# Patient Record
Sex: MALE | Race: BLACK OR AFRICAN AMERICAN | NOT HISPANIC OR LATINO | Employment: OTHER | URBAN - METROPOLITAN AREA
[De-identification: names, ages, dates, MRNs, and addresses within clinical notes are randomized per-mention and may not be internally consistent; named-entity substitution may affect disease eponyms.]

---

## 2017-04-10 ENCOUNTER — HOSPITAL ENCOUNTER (INPATIENT)
Facility: HOSPITAL | Age: 52
LOS: 2 days | Discharge: HOME HEALTH | DRG: 064 | End: 2017-04-12
Attending: PSYCHIATRY & NEUROLOGY | Admitting: PSYCHIATRY & NEUROLOGY
Payer: COMMERCIAL

## 2017-04-10 DIAGNOSIS — G93.6 CYTOTOXIC CEREBRAL EDEMA: ICD-10-CM

## 2017-04-10 DIAGNOSIS — I63.411 EMBOLIC STROKE INVOLVING RIGHT MIDDLE CEREBRAL ARTERY: ICD-10-CM

## 2017-04-10 DIAGNOSIS — E08.01 DIABETES MELLITUS DUE TO UNDERLYING CONDITION WITH HYPEROSMOLARITY AND COMA, WITH LONG-TERM CURRENT USE OF INSULIN: ICD-10-CM

## 2017-04-10 DIAGNOSIS — R00.0 TACHYCARDIA: ICD-10-CM

## 2017-04-10 DIAGNOSIS — R09.89 BRUIT: ICD-10-CM

## 2017-04-10 DIAGNOSIS — Z79.4 DIABETES MELLITUS DUE TO UNDERLYING CONDITION WITH HYPEROSMOLARITY AND COMA, WITH LONG-TERM CURRENT USE OF INSULIN: ICD-10-CM

## 2017-04-10 DIAGNOSIS — I10 HYPERTENSION: ICD-10-CM

## 2017-04-10 DIAGNOSIS — E86.1 HYPOVOLEMIA: ICD-10-CM

## 2017-04-10 DIAGNOSIS — I63.9 CVA (CEREBRAL VASCULAR ACCIDENT): ICD-10-CM

## 2017-04-10 DIAGNOSIS — E11.00 TYPE 2 DIABETES MELLITUS WITH HYPEROSMOLARITY WITHOUT COMA, WITHOUT LONG-TERM CURRENT USE OF INSULIN: Primary | ICD-10-CM

## 2017-04-10 PROBLEM — Z92.82 TISSUE PLASMINOGEN ACTIVATOR (T-PA) ADMINISTERED AT OTHER FACILITY WITHIN 24 HOURS PRIOR TO CURRENT ADMISSION: Status: ACTIVE | Noted: 2017-04-10

## 2017-04-10 PROBLEM — N18.30 CKD (CHRONIC KIDNEY DISEASE) STAGE 3, GFR 30-59 ML/MIN: Status: ACTIVE | Noted: 2017-04-10

## 2017-04-10 PROBLEM — G81.14 SPASTIC HEMIPLEGIA AFFECTING LEFT NONDOMINANT SIDE: Status: ACTIVE | Noted: 2017-04-10

## 2017-04-10 PROBLEM — R29.810 FACIAL DROOP: Status: ACTIVE | Noted: 2017-04-10

## 2017-04-10 LAB
ALBUMIN SERPL BCP-MCNC: 3.5 G/DL
ALP SERPL-CCNC: 84 U/L
ALT SERPL W/O P-5'-P-CCNC: 21 U/L
ANION GAP SERPL CALC-SCNC: 13 MMOL/L
APTT BLDCRRT: 24.2 SEC
AST SERPL-CCNC: 31 U/L
BASOPHILS # BLD AUTO: 0.01 K/UL
BASOPHILS NFR BLD: 0.1 %
BILIRUB SERPL-MCNC: 0.4 MG/DL
BUN SERPL-MCNC: 18 MG/DL
CALCIUM SERPL-MCNC: 8.4 MG/DL
CHLORIDE SERPL-SCNC: 106 MMOL/L
CHOLEST/HDLC SERPL: 4.7 {RATIO}
CHOLEST/HDLC SERPL: 5 {RATIO}
CO2 SERPL-SCNC: 20 MMOL/L
CREAT SERPL-MCNC: 1.6 MG/DL
DIFFERENTIAL METHOD: ABNORMAL
EOSINOPHIL # BLD AUTO: 0 K/UL
EOSINOPHIL NFR BLD: 0 %
ERYTHROCYTE [DISTWIDTH] IN BLOOD BY AUTOMATED COUNT: 13.7 %
EST. GFR  (AFRICAN AMERICAN): 56.8 ML/MIN/1.73 M^2
EST. GFR  (NON AFRICAN AMERICAN): 49.2 ML/MIN/1.73 M^2
GLUCOSE SERPL-MCNC: 149 MG/DL
HCT VFR BLD AUTO: 44.5 %
HDL/CHOLESTEROL RATIO: 20.2 %
HDL/CHOLESTEROL RATIO: 21.1 %
HDLC SERPL-MCNC: 180 MG/DL
HDLC SERPL-MCNC: 208 MG/DL
HDLC SERPL-MCNC: 38 MG/DL
HDLC SERPL-MCNC: 42 MG/DL
HGB BLD-MCNC: 15.6 G/DL
INR PPP: 1
INR PPP: 1.1
LDLC SERPL CALC-MCNC: 127 MG/DL
LDLC SERPL CALC-MCNC: 145.6 MG/DL
LYMPHOCYTES # BLD AUTO: 2 K/UL
LYMPHOCYTES NFR BLD: 16.5 %
MAGNESIUM SERPL-MCNC: 1.9 MG/DL
MCH RBC QN AUTO: 27.3 PG
MCHC RBC AUTO-ENTMCNC: 35.1 %
MCV RBC AUTO: 78 FL
MONOCYTES # BLD AUTO: 0.6 K/UL
MONOCYTES NFR BLD: 5.1 %
NEUTROPHILS # BLD AUTO: 9.6 K/UL
NEUTROPHILS NFR BLD: 77.9 %
NONHDLC SERPL-MCNC: 142 MG/DL
NONHDLC SERPL-MCNC: 166 MG/DL
PHOSPHATE SERPL-MCNC: 6.1 MG/DL
PLATELET # BLD AUTO: 213 K/UL
PMV BLD AUTO: 10.6 FL
POCT GLUCOSE: 158 MG/DL (ref 70–110)
POCT GLUCOSE: 29 MG/DL (ref 70–110)
POTASSIUM SERPL-SCNC: 3.4 MMOL/L
PROT SERPL-MCNC: 7 G/DL
PROTHROMBIN TIME: 10.9 SEC
PROTHROMBIN TIME: 11.2 SEC
RBC # BLD AUTO: 5.72 M/UL
SODIUM SERPL-SCNC: 139 MMOL/L
T4 FREE SERPL-MCNC: 1.18 NG/DL
TRIGL SERPL-MCNC: 102 MG/DL
TRIGL SERPL-MCNC: 75 MG/DL
TSH SERPL DL<=0.005 MIU/L-ACNC: 0.19 UIU/ML
TSH SERPL DL<=0.005 MIU/L-ACNC: 0.53 UIU/ML
WBC # BLD AUTO: 12.32 K/UL

## 2017-04-10 PROCEDURE — 93010 ELECTROCARDIOGRAM REPORT: CPT | Mod: ,,, | Performed by: INTERNAL MEDICINE

## 2017-04-10 PROCEDURE — 83735 ASSAY OF MAGNESIUM: CPT

## 2017-04-10 PROCEDURE — 83036 HEMOGLOBIN GLYCOSYLATED A1C: CPT | Mod: 91

## 2017-04-10 PROCEDURE — 99223 1ST HOSP IP/OBS HIGH 75: CPT | Mod: ,,, | Performed by: ANESTHESIOLOGY

## 2017-04-10 PROCEDURE — 25000003 PHARM REV CODE 250: Performed by: ANESTHESIOLOGY

## 2017-04-10 PROCEDURE — 84443 ASSAY THYROID STIM HORMONE: CPT | Mod: 91

## 2017-04-10 PROCEDURE — 85730 THROMBOPLASTIN TIME PARTIAL: CPT

## 2017-04-10 PROCEDURE — 84443 ASSAY THYROID STIM HORMONE: CPT

## 2017-04-10 PROCEDURE — 20000000 HC ICU ROOM

## 2017-04-10 PROCEDURE — 85610 PROTHROMBIN TIME: CPT

## 2017-04-10 PROCEDURE — 84439 ASSAY OF FREE THYROXINE: CPT

## 2017-04-10 PROCEDURE — 85025 COMPLETE CBC W/AUTO DIFF WBC: CPT

## 2017-04-10 PROCEDURE — 80061 LIPID PANEL: CPT

## 2017-04-10 PROCEDURE — 85610 PROTHROMBIN TIME: CPT | Mod: 91

## 2017-04-10 PROCEDURE — 93005 ELECTROCARDIOGRAM TRACING: CPT

## 2017-04-10 PROCEDURE — 86850 RBC ANTIBODY SCREEN: CPT

## 2017-04-10 PROCEDURE — 86900 BLOOD TYPING SEROLOGIC ABO: CPT

## 2017-04-10 PROCEDURE — 80053 COMPREHEN METABOLIC PANEL: CPT

## 2017-04-10 PROCEDURE — 84100 ASSAY OF PHOSPHORUS: CPT

## 2017-04-10 PROCEDURE — 80061 LIPID PANEL: CPT | Mod: 91

## 2017-04-10 PROCEDURE — 83036 HEMOGLOBIN GLYCOSYLATED A1C: CPT

## 2017-04-10 PROCEDURE — 25000003 PHARM REV CODE 250: Performed by: NURSE PRACTITIONER

## 2017-04-10 RX ORDER — SODIUM CHLORIDE 9 MG/ML
INJECTION, SOLUTION INTRAVENOUS CONTINUOUS
Status: DISCONTINUED | OUTPATIENT
Start: 2017-04-10 | End: 2017-04-11

## 2017-04-10 RX ORDER — SODIUM CHLORIDE 0.9 % (FLUSH) 0.9 %
3 SYRINGE (ML) INJECTION EVERY 8 HOURS
Status: DISCONTINUED | OUTPATIENT
Start: 2017-04-10 | End: 2017-04-12 | Stop reason: HOSPADM

## 2017-04-10 RX ORDER — CARVEDILOL 12.5 MG/1
12.5 TABLET ORAL 2 TIMES DAILY WITH MEALS
Status: DISCONTINUED | OUTPATIENT
Start: 2017-04-11 | End: 2017-04-12 | Stop reason: HOSPADM

## 2017-04-10 RX ORDER — CLONIDINE HYDROCHLORIDE 0.1 MG/1
0.1 TABLET ORAL 2 TIMES DAILY
Status: DISCONTINUED | OUTPATIENT
Start: 2017-04-10 | End: 2017-04-12 | Stop reason: HOSPADM

## 2017-04-10 RX ORDER — POLYETHYLENE GLYCOL 3350 17 G/17G
17 POWDER, FOR SOLUTION ORAL DAILY
Status: DISCONTINUED | OUTPATIENT
Start: 2017-04-11 | End: 2017-04-12 | Stop reason: HOSPADM

## 2017-04-10 RX ORDER — ONDANSETRON 2 MG/ML
4 INJECTION INTRAMUSCULAR; INTRAVENOUS EVERY 12 HOURS PRN
Status: DISCONTINUED | OUTPATIENT
Start: 2017-04-10 | End: 2017-04-12 | Stop reason: HOSPADM

## 2017-04-10 RX ORDER — INSULIN ASPART 100 [IU]/ML
1-10 INJECTION, SOLUTION INTRAVENOUS; SUBCUTANEOUS EVERY 6 HOURS PRN
Status: DISCONTINUED | OUTPATIENT
Start: 2017-04-10 | End: 2017-04-12 | Stop reason: HOSPADM

## 2017-04-10 RX ORDER — GLUCAGON 1 MG
1 KIT INJECTION
Status: DISCONTINUED | OUTPATIENT
Start: 2017-04-10 | End: 2017-04-12 | Stop reason: HOSPADM

## 2017-04-10 RX ORDER — AMOXICILLIN 250 MG
1 CAPSULE ORAL 2 TIMES DAILY
Status: DISCONTINUED | OUTPATIENT
Start: 2017-04-10 | End: 2017-04-12 | Stop reason: HOSPADM

## 2017-04-10 RX ORDER — ATORVASTATIN CALCIUM 10 MG/1
10 TABLET, FILM COATED ORAL DAILY
Status: DISCONTINUED | OUTPATIENT
Start: 2017-04-11 | End: 2017-04-11

## 2017-04-10 RX ADMIN — Medication 3 ML: at 10:04

## 2017-04-10 RX ADMIN — SODIUM CHLORIDE 1000 ML: 0.9 INJECTION, SOLUTION INTRAVENOUS at 09:04

## 2017-04-10 RX ADMIN — CLONIDINE HYDROCHLORIDE 0.1 MG: 0.1 TABLET ORAL at 09:04

## 2017-04-10 NOTE — IP AVS SNAPSHOT
[facility]  [address]  [facility] [address]  Phone: [phone]           Patient Discharge Instructions   Our goal is to set you up for success. This packet includes information on your condition, medications, and your home care.  It will help you care for yourself to prevent having to return to the hospital.     Please ask your nurse if you have any questions.      There are many details to remember when preparing to leave the hospital. Here is what you will need to do:    1. Take your medicine. If you are prescribed medications, review your Medication List on the following pages. You may have new medications to  at the pharmacy and others that you'll need to stop taking. Review the instructions for how and when to take your medications. Talk with your doctor or nurses if you are unsure of what to do.     2. Go to your follow-up appointments. Specific follow-up information is listed in the following pages. Your may be contacted by a nurse or clinical provider about future appointments. Be sure we have all of the phone numbers to reach you. Please contact your provider's office if you are unable to make an appointment.     3. Watch for warning signs. Your doctor or nurse will give you detailed warning signs to watch for and when to call for assistance. These instructions may also include educational information about your condition. If you experience any of warning signs to your health, call your doctor.           [facility]  Unless otherwise directed by your provider, please   contact [facility], our nurse care line   that is available for 24/7 assistance.     [phone] (toll-free)     Registered nurses in the [facility]   provide: appointment scheduling, clinical advisement, health education, and other advisory services.                  ** Verify the list of medication(s) below is accurate and up to date. Carry this with you in case of  emergency. If your medications have changed, please notify your healthcare provider.             Medication List      START taking these medications        Additional Info                      aspirin 325 MG tablet   Refills:  0   Dose:  325 mg    Last time this was given:  325 mg on 4/12/2017  8:05 AM   Instructions:  Take 1 tablet (325 mg total) by mouth once daily.     Begin Date    AM    Noon    PM    Bedtime       nicotine 21 mg/24 hr   Commonly known as:  NICODERM CQ   Refills:  0   Dose:  1 patch    Last time this was given:  1 patch on 4/12/2017  8:05 AM   Instructions:  Place 1 patch onto the skin once daily.     Begin Date    AM    Noon    PM    Bedtime         CHANGE how you take these medications        Additional Info                      atorvastatin 40 MG tablet   Commonly known as:  LIPITOR   Quantity:  30 tablet   Refills:  1   Dose:  40 mg   What changed:    - medication strength  - how much to take    Last time this was given:  40 mg on 4/12/2017  8:05 AM   Instructions:  Take 1 tablet (40 mg total) by mouth once daily.     Begin Date    AM    Noon    PM    Bedtime         CONTINUE taking these medications        Additional Info                      baclofen 10 MG tablet   Commonly known as:  LIORESAL   Refills:  0   Dose:  10 mg    Instructions:  Take 10 mg by mouth 2 (two) times daily.     Begin Date    AM    Noon    PM    Bedtime       benazepril 20 MG tablet   Commonly known as:  LOTENSIN   Refills:  0   Dose:  20 mg    Instructions:  Take 20 mg by mouth once daily.     Begin Date    AM    Noon    PM    Bedtime       carisoprodol 350 MG tablet   Commonly known as:  SOMA   Refills:  0   Dose:  350 mg    Instructions:  Take 350 mg by mouth 4 (four) times daily as needed.     Begin Date    AM    Noon    PM    Bedtime       carvedilol 12.5 MG tablet   Commonly known as:  COREG   Refills:  0   Dose:  12.5 mg    Last time this was given:  12.5 mg on 4/12/2017  5:15 PM   Instructions:  Take 12.5 mg  by mouth 2 (two) times daily with meals.     Begin Date    AM    Noon    PM    Bedtime       cloNIDine 0.2 MG tablet   Commonly known as:  CATAPRES   Refills:  0   Dose:  0.2 mg   Indications:  Hypertension    Last time this was given:  0.1 mg on 4/12/2017  8:05 AM   Instructions:  Take 0.2 mg by mouth 2 (two) times daily.     Begin Date    AM    Noon    PM    Bedtime       gabapentin 600 MG tablet   Commonly known as:  NEURONTIN   Refills:  0   Dose:  600 mg    Instructions:  Take 600 mg by mouth 3 (three) times daily.     Begin Date    AM    Noon    PM    Bedtime       GLUCOPHAGE 500 MG tablet   Refills:  0   Dose:  500 mg   Generic drug:  metformin    Instructions:  Take 500 mg by mouth 2 (two) times daily with meals.     Begin Date    AM    Noon    PM    Bedtime       nitroGLYCERIN 0.4 MG/DOSE TL SPRY 400 mcg/spray spray   Commonly known as:  NITROLINGUAL   Refills:  0   Dose:  1 spray    Instructions:  Place 1 spray under the tongue every 5 (five) minutes as needed.     Begin Date    AM    Noon    PM    Bedtime       * oxycodone 30 MG Tab   Commonly known as:  ROXICODONE   Refills:  0   Dose:  5 mg   Indications:  Pain    Last time this was given:  5 mg on 4/12/2017 11:03 AM   Instructions:  Take 5 mg by mouth every 6 (six) hours as needed.     Begin Date    AM    Noon    PM    Bedtime       * oxycodone 5 mg Cap   Commonly known as:  OXY-IR   Refills:  0   Dose:  10 mg   Indications:  Pain    Instructions:  Take 10 mg by mouth every 4 (four) hours as needed.     Begin Date    AM    Noon    PM    Bedtime       TRIBENZOR 40-10-25 mg Tab   Refills:  0   Generic drug:  olmesartan-amlodipin-hcthiazid    Instructions:  Take by mouth.     Begin Date    AM    Noon    PM    Bedtime       * Notice:  This list has 2 medication(s) that are the same as other medications prescribed for you. Read the directions carefully, and ask your doctor or other care provider to review them with you.         Where to Get Your Medications       These medications were sent to [facility] - [place], [place] - [address]  [address], [place] [address]     Phone:  [phone]     atorvastatin 40 MG tablet         You can get these medications from any pharmacy     You don't need a prescription for these medications     aspirin 325 MG tablet    nicotine 21 mg/24 hr                  Please bring to all follow up appointments:    1. A copy of your discharge instructions.  2. All medicines you are currently taking in their original bottles.  3. Identification and insurance card.    Please arrive 15 minutes ahead of scheduled appointment time.    Please call 24 hours in advance if you must reschedule your appointment and/or time.        Follow-up Information     Follow up with John Small MD In 1 week.    Specialty:  Internal Medicine    Contact information:    [facility] [facility] [address] [phone]          Follow up with [facility] In 4 weeks.    Specialty:  Vascular Neurology    Why:  stroke follow up appointment    Contact information:    [address] [place] [id number] [phone]        Discharge Instructions     Future Orders    Activity as tolerated     Diet general     Questions:    Total calories:      Fat restriction, if any:      Protein restriction, if any:      Na restriction, if any:      Fluid restriction:      Additional restrictions:  Diabetic 1800        Primary Diagnosis     Your primary diagnosis was:  Stroke      Admission Information     Date & Time Provider Department CSN    4/10/2017  7:51 PM Jose Samano MD [facility] [id number]      Care Providers     Provider Role Specialty Primary office phone    Jose Samano MD Attending Provider Neurology [phone]    Amador Huber MD Consulting Physician  Neuro Critical Care [phone]    Torres Kumar MD Consulting Physician   "Neuro Critical Care [phone]    Akira Leung MD Consulting Physician  Interventional Neurology [phone]    Angel Aviles MD Consulting Physician  Neurology [phone]      Your Vitals Were     BP Pulse Temp Resp Height Weight    178/76 (BP Location: Right arm, Patient Position: Sitting, BP Method: Automatic) 76 98.6 °F (37 °C) (Oral) 22 5' 11" (1.803 m) 69.2 kg (152 lb 8.9 oz)    SpO2 BMI             100% 21.28 kg/m2         Recent Lab Values        4/10/2017 4/10/2017                        8:57 PM  9:55 PM          A1C 6.9 (H) 6.9 (H)          Comment for A1C at  8:57 PM on 4/10/2017:  According to ADA guidelines, hemoglobin A1C <7.0% represents  optimal control in non-pregnant diabetic patients.  Different  metrics may apply to specific populations.   Standards of Medical Care in Diabetes - 2016.  For the purpose of screening for the presence of diabetes:  <5.7%     Consistent with the absence of diabetes  5.7-6.4%  Consistent with increasing risk for diabetes   (prediabetes)  >or=6.5%  Consistent with diabetes  Currently no consensus exists for use of hemoglobin A1C  for diagnosis of diabetes for children.      Comment for A1C at  9:55 PM on 4/10/2017:  According to ADA guidelines, hemoglobin A1C <7.0% represents  optimal control in non-pregnant diabetic patients.  Different  metrics may apply to specific populations.   Standards of Medical Care in Diabetes - 2016.  For the purpose of screening for the presence of diabetes:  <5.7%     Consistent with the absence of diabetes  5.7-6.4%  Consistent with increasing risk for diabetes   (prediabetes)  >or=6.5%  Consistent with diabetes  Currently no consensus exists for use of hemoglobin A1C  for diagnosis of diabetes for children.        Allergies as of 4/12/2017        Reactions    Valium [Diazepam]       Advance Directives     An advance directive is a document which, in the event you are no longer able to make decisions for yourself, tells " your healthcare team what kind of treatment you do or do not want to receive, or who you would like to make those decisions for you.  If you do not currently have an advance directive, [facility] encourages you to create one.  For more information call:  [phone]), [phone] ([phone]),  or log on to [email or web address].        Language Assistance Services     ATTENTION: Language assistance services are available, free of charge. Please call [phone].      ATENCIÓN: Si habla español, tiene a leblanc disposición servicios gratuitos de asistencia lingüística. Llame al [phone].     CHÚ Ý: N?u b?n nói Ti?ng Vi?t, có các d?ch v? h? tr? ngôn ng? mi?n phí dành cho b?n. G?i s? [phone].        Stroke Education              Diabetes Discharge Instructions                                   [facility] Sign-Up     Activating your [facility] account is as easy as 1-2-3!     1) Visit [email or web address], select Sign Up Now, enter this activation code and your date of birth, then select Next.  [email or web address]  Expires: 5/27/2017  6:17 PM      2) Create a username and password to use when you visit [facility] in the future and select a security question in case you lose your password and select Next.    3) Enter your e-mail address and click Sign Up!    Additional Information  If you have questions, please e-mail [email or web address] or call [phone] to talk to our [facility] staff. Remember, [facility] is NOT to be used for urgent needs. For medical emergencies, dial 911.          [facility] complies with applicable Federal civil rights laws and does not discriminate on the basis of race, color, national origin, age, disability, or sex.

## 2017-04-11 PROBLEM — N17.9 AKI (ACUTE KIDNEY INJURY): Status: ACTIVE | Noted: 2017-04-10

## 2017-04-11 PROBLEM — I63.511 ACUTE RIGHT MCA STROKE: Status: ACTIVE | Noted: 2017-04-11

## 2017-04-11 LAB
ABO + RH BLD: NORMAL
ALBUMIN SERPL BCP-MCNC: 3.4 G/DL
ALP SERPL-CCNC: 85 U/L
ALT SERPL W/O P-5'-P-CCNC: 20 U/L
AMPHET+METHAMPHET UR QL: NEGATIVE
ANION GAP SERPL CALC-SCNC: 11 MMOL/L
AORTIC VALVE STENOSIS: ABNORMAL
AST SERPL-CCNC: 32 U/L
BARBITURATES UR QL SCN>200 NG/ML: NEGATIVE
BASOPHILS # BLD AUTO: 0.02 K/UL
BASOPHILS NFR BLD: 0.2 %
BENZODIAZ UR QL SCN>200 NG/ML: NEGATIVE
BILIRUB SERPL-MCNC: 0.5 MG/DL
BILIRUB UR QL STRIP: NEGATIVE
BLD GP AB SCN CELLS X3 SERPL QL: NORMAL
BUN SERPL-MCNC: 18 MG/DL
BZE UR QL SCN: NEGATIVE
CALCIUM SERPL-MCNC: 8.8 MG/DL
CANNABINOIDS UR QL SCN: NEGATIVE
CHLORIDE SERPL-SCNC: 103 MMOL/L
CLARITY UR REFRACT.AUTO: ABNORMAL
CO2 SERPL-SCNC: 24 MMOL/L
COLOR UR AUTO: YELLOW
CREAT SERPL-MCNC: 1.3 MG/DL
CREAT UR-MCNC: 207 MG/DL
DIASTOLIC DYSFUNCTION: NO
DIFFERENTIAL METHOD: ABNORMAL
EOSINOPHIL # BLD AUTO: 0.1 K/UL
EOSINOPHIL NFR BLD: 0.7 %
ERYTHROCYTE [DISTWIDTH] IN BLOOD BY AUTOMATED COUNT: 13.7 %
EST. GFR  (AFRICAN AMERICAN): >60 ML/MIN/1.73 M^2
EST. GFR  (NON AFRICAN AMERICAN): >60 ML/MIN/1.73 M^2
ESTIMATED AVG GLUCOSE: 151 MG/DL
ESTIMATED AVG GLUCOSE: 151 MG/DL
GLUCOSE SERPL-MCNC: 131 MG/DL
GLUCOSE UR QL STRIP: NEGATIVE
HBA1C MFR BLD HPLC: 6.9 %
HBA1C MFR BLD HPLC: 6.9 %
HCT VFR BLD AUTO: 41.4 %
HGB BLD-MCNC: 14.1 G/DL
HGB UR QL STRIP: ABNORMAL
HYALINE CASTS UR QL AUTO: 1 /LPF
KETONES UR QL STRIP: NEGATIVE
LEUKOCYTE ESTERASE UR QL STRIP: ABNORMAL
LYMPHOCYTES # BLD AUTO: 3.6 K/UL
LYMPHOCYTES NFR BLD: 36.9 %
MAGNESIUM SERPL-MCNC: 2.4 MG/DL
MCH RBC QN AUTO: 26.9 PG
MCHC RBC AUTO-ENTMCNC: 34.1 %
MCV RBC AUTO: 79 FL
METHADONE UR QL SCN>300 NG/ML: NEGATIVE
MICROSCOPIC COMMENT: ABNORMAL
MONOCYTES # BLD AUTO: 0.6 K/UL
MONOCYTES NFR BLD: 6.2 %
NEUTROPHILS # BLD AUTO: 5.5 K/UL
NEUTROPHILS NFR BLD: 55.8 %
NITRITE UR QL STRIP: NEGATIVE
OPIATES UR QL SCN: NORMAL
PCP UR QL SCN>25 NG/ML: NEGATIVE
PH UR STRIP: 5 [PH] (ref 5–8)
PHOSPHATE SERPL-MCNC: 5.7 MG/DL
PLATELET # BLD AUTO: 170 K/UL
PMV BLD AUTO: 9.9 FL
POCT GLUCOSE: 110 MG/DL (ref 70–110)
POCT GLUCOSE: 127 MG/DL (ref 70–110)
POCT GLUCOSE: 131 MG/DL (ref 70–110)
POCT GLUCOSE: 131 MG/DL (ref 70–110)
POTASSIUM SERPL-SCNC: 3.9 MMOL/L
PROT SERPL-MCNC: 7 G/DL
PROT UR QL STRIP: NEGATIVE
RBC # BLD AUTO: 5.25 M/UL
RBC #/AREA URNS AUTO: 8 /HPF (ref 0–4)
RETIRED EF AND QEF - SEE NOTES: 65 (ref 55–65)
SODIUM SERPL-SCNC: 138 MMOL/L
SP GR UR STRIP: 1.02 (ref 1–1.03)
SQUAMOUS #/AREA URNS AUTO: 1 /HPF
TOXICOLOGY INFORMATION: NORMAL
TRICUSPID VALVE REGURGITATION: ABNORMAL
URN SPEC COLLECT METH UR: ABNORMAL
UROBILINOGEN UR STRIP-ACNC: NEGATIVE EU/DL
WBC # BLD AUTO: 9.84 K/UL
WBC #/AREA URNS AUTO: 53 /HPF (ref 0–5)

## 2017-04-11 PROCEDURE — 82570 ASSAY OF URINE CREATININE: CPT

## 2017-04-11 PROCEDURE — 25000003 PHARM REV CODE 250: Performed by: NURSE PRACTITIONER

## 2017-04-11 PROCEDURE — 93306 TTE W/DOPPLER COMPLETE: CPT | Mod: 26,,, | Performed by: INTERNAL MEDICINE

## 2017-04-11 PROCEDURE — 81001 URINALYSIS AUTO W/SCOPE: CPT

## 2017-04-11 PROCEDURE — 20000000 HC ICU ROOM

## 2017-04-11 PROCEDURE — G8996 SWALLOW CURRENT STATUS: HCPCS | Mod: CJ

## 2017-04-11 PROCEDURE — 97530 THERAPEUTIC ACTIVITIES: CPT

## 2017-04-11 PROCEDURE — 85025 COMPLETE CBC W/AUTO DIFF WBC: CPT

## 2017-04-11 PROCEDURE — 99233 SBSQ HOSP IP/OBS HIGH 50: CPT | Mod: ,,, | Performed by: PSYCHIATRY & NEUROLOGY

## 2017-04-11 PROCEDURE — 97162 PT EVAL MOD COMPLEX 30 MIN: CPT

## 2017-04-11 PROCEDURE — G8987 SELF CARE CURRENT STATUS: HCPCS | Mod: CJ

## 2017-04-11 PROCEDURE — 97165 OT EVAL LOW COMPLEX 30 MIN: CPT

## 2017-04-11 PROCEDURE — 83735 ASSAY OF MAGNESIUM: CPT

## 2017-04-11 PROCEDURE — G8997 SWALLOW GOAL STATUS: HCPCS | Mod: CJ

## 2017-04-11 PROCEDURE — 93306 TTE W/DOPPLER COMPLETE: CPT

## 2017-04-11 PROCEDURE — 25000003 PHARM REV CODE 250: Performed by: STUDENT IN AN ORGANIZED HEALTH CARE EDUCATION/TRAINING PROGRAM

## 2017-04-11 PROCEDURE — 92610 EVALUATE SWALLOWING FUNCTION: CPT

## 2017-04-11 PROCEDURE — G8988 SELF CARE GOAL STATUS: HCPCS | Mod: CI

## 2017-04-11 PROCEDURE — 97802 MEDICAL NUTRITION INDIV IN: CPT | Performed by: NUTRITIONIST

## 2017-04-11 PROCEDURE — 80307 DRUG TEST PRSMV CHEM ANLYZR: CPT

## 2017-04-11 PROCEDURE — 80053 COMPREHEN METABOLIC PANEL: CPT

## 2017-04-11 PROCEDURE — 92523 SPEECH SOUND LANG COMPREHEN: CPT

## 2017-04-11 PROCEDURE — 84100 ASSAY OF PHOSPHORUS: CPT

## 2017-04-11 PROCEDURE — 94761 N-INVAS EAR/PLS OXIMETRY MLT: CPT

## 2017-04-11 RX ORDER — ASPIRIN 325 MG
325 TABLET ORAL DAILY
Status: DISCONTINUED | OUTPATIENT
Start: 2017-04-12 | End: 2017-04-12 | Stop reason: HOSPADM

## 2017-04-11 RX ORDER — GABAPENTIN 300 MG/1
600 CAPSULE ORAL 3 TIMES DAILY
Status: DISCONTINUED | OUTPATIENT
Start: 2017-04-11 | End: 2017-04-12 | Stop reason: HOSPADM

## 2017-04-11 RX ORDER — HEPARIN SODIUM 5000 [USP'U]/ML
5000 INJECTION, SOLUTION INTRAVENOUS; SUBCUTANEOUS EVERY 8 HOURS
Status: DISCONTINUED | OUTPATIENT
Start: 2017-04-11 | End: 2017-04-12 | Stop reason: HOSPADM

## 2017-04-11 RX ORDER — ACETAMINOPHEN 325 MG/1
650 TABLET ORAL EVERY 6 HOURS PRN
Status: DISCONTINUED | OUTPATIENT
Start: 2017-04-11 | End: 2017-04-12 | Stop reason: HOSPADM

## 2017-04-11 RX ORDER — BACLOFEN 10 MG/1
10 TABLET ORAL 2 TIMES DAILY
COMMUNITY

## 2017-04-11 RX ORDER — OXYCODONE HYDROCHLORIDE 5 MG/1
5 TABLET ORAL EVERY 6 HOURS PRN
Status: DISCONTINUED | OUTPATIENT
Start: 2017-04-11 | End: 2017-04-12 | Stop reason: HOSPADM

## 2017-04-11 RX ORDER — ATORVASTATIN CALCIUM 20 MG/1
40 TABLET, FILM COATED ORAL DAILY
Status: DISCONTINUED | OUTPATIENT
Start: 2017-04-12 | End: 2017-04-12 | Stop reason: HOSPADM

## 2017-04-11 RX ORDER — IBUPROFEN 200 MG
1 TABLET ORAL DAILY
Status: DISCONTINUED | OUTPATIENT
Start: 2017-04-11 | End: 2017-04-12 | Stop reason: HOSPADM

## 2017-04-11 RX ADMIN — ATORVASTATIN CALCIUM 10 MG: 10 TABLET, FILM COATED ORAL at 09:04

## 2017-04-11 RX ADMIN — CLONIDINE HYDROCHLORIDE 0.1 MG: 0.1 TABLET ORAL at 08:04

## 2017-04-11 RX ADMIN — HEPARIN SODIUM 5000 UNITS: 5000 INJECTION, SOLUTION INTRAVENOUS; SUBCUTANEOUS at 10:04

## 2017-04-11 RX ADMIN — OXYCODONE HYDROCHLORIDE 5 MG: 5 TABLET ORAL at 12:04

## 2017-04-11 RX ADMIN — CARVEDILOL 12.5 MG: 12.5 TABLET, FILM COATED ORAL at 05:04

## 2017-04-11 RX ADMIN — ATORVASTATIN CALCIUM 30 MG: 10 TABLET, FILM COATED ORAL at 10:04

## 2017-04-11 RX ADMIN — CLONIDINE HYDROCHLORIDE 0.1 MG: 0.1 TABLET ORAL at 09:04

## 2017-04-11 RX ADMIN — CARVEDILOL 12.5 MG: 12.5 TABLET, FILM COATED ORAL at 08:04

## 2017-04-11 RX ADMIN — NICOTINE 1 PATCH: 21 PATCH, EXTENDED RELEASE TRANSDERMAL at 12:04

## 2017-04-11 RX ADMIN — STANDARDIZED SENNA CONCENTRATE AND DOCUSATE SODIUM 1 TABLET: 8.6; 5 TABLET, FILM COATED ORAL at 08:04

## 2017-04-11 RX ADMIN — GABAPENTIN 600 MG: 300 CAPSULE ORAL at 03:04

## 2017-04-11 RX ADMIN — ACETAMINOPHEN 650 MG: 325 TABLET ORAL at 03:04

## 2017-04-11 RX ADMIN — OXYCODONE HYDROCHLORIDE 5 MG: 5 TABLET ORAL at 07:04

## 2017-04-11 RX ADMIN — Medication 3 ML: at 10:04

## 2017-04-11 NOTE — H&P
ICU Progress Note  Neurocritical Care    Admit Date: 4/10/2017  LOS: 0    CC: <principal problem not specified>    Code Status: Full Code     SUBJECTIVE:     Interval History/Significant Events:   Mr. Bustos is a 51 yr old gentleman who has been [place] [facility] [place]PA at an OSH for L eft sided plegia.    He as mowing is lawn when he first experienced weakness in the L side of body at 2 PM this afternoon.    Hypovolemia  R ight MCA syndrome  Hypokalemia  Atn; sec t dehydration      Denies ETOH use  Occasional smoker.    PMH : disabled due to chronic back pain; failed back surgeries X2    Review of Symptoms:  Constitutional: Denies fevers or chills.  Pulmonary: Denies shortness of breath or cough.  Cardiology: Denies chest pain or palpitations.  GI: Denies abdominal pain or constipation.  Neurologic: Denies new weakness, headaches, or paresthesias.     Medications:  Continuous Infusions:   sodium chloride 0.9%       Scheduled Meds:   [START ON 4/11/2017] atorvastatin  10 mg Oral Daily    [START ON 4/11/2017] carvedilol  12.5 mg Oral BID WM    cloNIDine  0.1 mg Oral BID    [START ON 4/11/2017] polyethylene glycol  17 g Oral Daily    senna-docusate 8.6-50 mg  1 tablet Oral BID    sodium chloride 0.9%  3 mL Intravenous Q8H     PRN Meds:.dextrose 50%, glucagon (human recombinant), flu vac [id number] 36mos up(PF), insulin aspart, ondansetron, pneumoc 13-torri conj-dip cr(PF)    OBJECTIVE:   Vital Signs (Most Recent):   Temp: 98.6 °F (37 °C) (04/10/17 1945)  Pulse: 97 (04/10/17 2115)  Resp: 18 (04/10/17 2115)  BP: (!) 182/96 (04/10/17 2115)  SpO2: 99 % (04/10/17 2115)    Vital Signs (24h Range):   Temp:  [98.6 °F (37 °C)] 98.6 °F (37 °C)  Pulse:  [] 97  Resp:  [17-23] 18  SpO2:  [96 %-99 %] 99 %  BP: (131-192)/(83-96) 182/96    ICP/CPP (Last 24h):        I & O (Last 24h): No intake or output data in the 24 hours ending 04/10/17 7764  Physical Exam:  GA: Alert, comfortable, no acute distress.    HEENT: No scleral icterus or JVD.   Pulmonary: Clear to auscultation A/P/L. No wheezing, crackles, or rhonchi.  Cardiac: RRR S1 & S2 w/o rubs/murmurs/gallops.   Abdominal: Bowel sounds present x 4. No appreciable hepatosplenomegaly.  Skin: No jaundice, rashes, or visible lesions.  Neuro:  Awake  Alert  ox4  CN intact  No drift  Power 5/5 all 4 extremities         Lines/Drains/Airway:          Nutrition/Tube Feeds (if NPO state why): npo     Labs:  ABG: No results for input(s): PH, PO2, PCO2, HCO3, POCSATURATED, BE in the last 24 hours.  BMP:No results for input(s): NA, K, CL, CO2, BUN, CREATININE, GLU, MG, PHOS in the last 24 hours.  LFT: No results found for: AST, ALT, GGT, ALKPHOS, BILITOT, ALBUMIN, PROT  CBC:   Lab Results   Component Value Date    WBC 12.32 04/10/2017    HGB 15.6 04/10/2017    HCT 44.5 04/10/2017    MCV 78 (L) 04/10/2017     04/10/2017     Microbiology x 7d:   Microbiology Results (last 7 days)     ** No results found for the last 168 hours. **        Imaging:  CXR; clear      CT head:Punctate foci of hypoattenuation within the left basal ganglia, may reflect sequela of acute/subacute infarct in this patient with history of right MCA syndrome status post TPA noting no significant edema or hemorrhage.  I personally reviewed the above image.    ASSESSMENT/PLAN:     Active Hospital Problems    Diagnosis    CVA (cerebral vascular accident)    Tissue plasminogen activator (t-PA) administered at other facility within 24 hours prior to current admission    Cytotoxic cerebral edema    Hypovolemia    DM (diabetes mellitus)    HTN (hypertension)      Plan:  ECHO  Fluids  Follow exam  MRI in am      Level;3

## 2017-04-11 NOTE — PROGRESS NOTES
ICU Attending Note  Neurocritical Care    Slight L arm weakness. + L sided numbness.    -For tPA, thrombectomy,  *Continue Acute Stroke Intervention protocol, including stroke patient/family education  *Perform neuro and vital checks q15min x2 hours after intervention, then q30min from 2-8 hours, then q1h x24 hours  *No anticoagulants, antiplatelets, and, if possible, NGT, AL, CVC for 24 hours  *Reimage 24 hours after intervention with MR brain, MRA head without contrast  -carotid USN  -clonidine, carvedilol, consider amlodipine 10 mg  -TTE  -Telemetry      --180  -Normothermia  -Glycemic control  -NPO until cleared to swallow by BSS or SLP  -PT, OT, SLP consults as appropriate  -up with assist    Goal: Continue post tPA, stroke management.

## 2017-04-11 NOTE — PROGRESS NOTES
[facility]  Neurocritical Care  Progress Note    Admit Date: 4/10/2017  Length of Stay: 1    Subjective:     Chief Complaint: Embolic stroke involving right middle cerebral artery    History of Present Illness: Mr. Bustos is a 51 yr old gentleman who [place] [facility] [place] at an OSH for L eft sided plegia.     He as mowing is lawn when he first experienced weakness in the L side of body at 2 PM this afternoon.      Hospital Course:      Interval History:  Symptoms improving.  NAEON.    Review of Systems   Eyes: Negative for visual disturbance.   Respiratory: Negative for cough and shortness of breath.    Cardiovascular: Negative for chest pain and palpitations.   Gastrointestinal: Negative for nausea and vomiting.   Skin: Negative for rash and wound.   Neurological: Positive for speech difficulty and weakness.   Psychiatric/Behavioral: Negative for agitation and behavioral problems.       Objective:     Vitals:  Temp: 98.3 °F (36.8 °C) (04/11/17 1100)  Pulse: 65 (04/11/17 1400)  Resp: 16 (04/11/17 1400)  BP: (!) 124/58 (04/11/17 1400)  SpO2: 97 % (04/11/17 1500)    Temp:  [98.3 °F (36.8 °C)-98.9 °F (37.2 °C)] 98.3 °F (36.8 °C)  Pulse:  [] 65  Resp:  [16-25] 16  SpO2:  [96 %-99 %] 97 %  BP: (124-192)/(58-90) 124/58        04/10 0701 - 04/11 0700  In: 1675 [I.V.:675]  Out: 850 [Urine:850]    Physical Exam   Constitutional: He is oriented to person, place, and time. He appears well-developed and well-nourished. No distress.   HENT:   Head: Normocephalic and atraumatic.   Eyes: EOM are normal.   Cardiovascular: Normal rate.    Pulmonary/Chest: Effort normal.   Abdominal: Soft.   Neurological: He is alert and oriented to person, place, and time. GCS eye subscore is 4 - spontaneous. GCS verbal subscore is 5 - oriented. GCS motor subscore is 6 - obeys commands.   Skin: Skin is dry. No rash noted. He is not diaphoretic. No erythema.   Psychiatric: He has a normal mood and  affect. His behavior is normal. Judgment and thought content normal.   Nursing note and vitals reviewed.      NEUROLOGICAL EXAMINATION:     MENTAL STATUS   Oriented to person, place, and time.   Attention: normal. Concentration: normal.   Level of consciousness: alert    CRANIAL NERVES     CN II   Visual fields full to confrontation.     CN III, IV, VI   Extraocular motions are normal.     CN VIII   Hearing: intact    MOTOR EXAM   Muscle bulk: normal  Overall muscle tone: normal       L sided drift         Fackler Coma Scale:  Best Eye Response: 4 - spontaneous  Best Motor Response: 6 - obeys commands  Best Verbal Response: 5 - oriented  Keith Coma Scale Total: 15            Medications:  Continuous Scheduled  [START ON 4/12/2017] atorvastatin 40 mg Daily   carvedilol 12.5 mg BID WM   cloNIDine 0.1 mg BID   gabapentin 600 mg TID   nicotine 1 patch Daily   polyethylene glycol 17 g Daily   senna-docusate 8.6-50 mg 1 tablet BID   sodium chloride 0.9% 3 mL Q8H   PRN  acetaminophen 650 mg Q6H PRN   dextrose 50% 12.5 g PRN   glucagon (human recombinant) 1 mg PRN   flu vac [id number] 36mos up(PF) 0.5 mL vaccine x 1 dose   insulin aspart 1-10 Units Q6H PRN   ondansetron 4 mg Q12H PRN   oxycodone 5 mg Q6H PRN   pneumoc 13-torri conj-dip cr(PF) 0.5 mL vaccine x 1 dose     Today I personally reviewed pertinent medications, lines/drains/airways, imaging, cardiology, lab results,     Assessment/Plan:     Neuro  * Embolic stroke involving right middle cerebral artery  S/p tPA  MRI for 24h scan at 1900  MRA  Carotid US  TTE    Cytotoxic cerebral edema  2/2 stroke  As seen on cerebral imaging    Cardiac  Essential hypertension  Stroke RF  Goal SBP<180 given tPA  Titrate antihypertensives as needed    Renal  ALONSO (acute kidney injury)  BUN/Cr 18/1.6 on admission; 18/1.3 today  Unclear baseline - no priors available  IVF  Monitor    Endocrine  Type 2 diabetes mellitus, without long-term current use of insulin  Stroke RF  Hold hold  metformin  SSI for now  Monitor    Fluids/Electrolytes/Nutrition/GI  Hyperlipidemia  Stroke RF   on admission  Atorvastatin 40mg daily    Hypovolemia  IVF until po diet    Other  Tissue plasminogen activator (t-PA) administered at other facility within 24 hours prior to current admission  Given at 1900 on 4/10 for R MCA syndrome    Facial droop  2/2 stroke  Improving      Prophylaxis:  Venous Thromboembolism: mechanical  Stress Ulcer: NA  Ventilator Pneumonia: not applicable     Activity Orders          None        Full Code    Florida Nettles MD  Neurocritical Care  [facility]

## 2017-04-11 NOTE — PLAN OF CARE
SW met with Pt family while Pt was out of the room bedside. Discussed therapy recs for HH and gave list. Pt returned with RN. Advised to contact [facility] with any questions. They will review and give choices ASAP.    Miguelina Funez LMSW  Neurocritical Care   [facility]  [id number]

## 2017-04-11 NOTE — PLAN OF CARE
Problem: SLP Goal  Goal: SLP Goal   Swallowing wfl but pt with difficulty with meats. Recommend changing diet to SOFT and thin.      JOSELIN Camacho, CCC-SLP  4/11/2017

## 2017-04-11 NOTE — PT/OT/SLP EVAL
"Occupational Therapy  Evaluation    Sammy Bustos   MRN: [id number]   Admitting Diagnosis: Embolic stroke involving right middle cerebral artery    OT Date of Treatment: 04/11/17   OT Start Time: 0454  OT Stop Time: 0524  OT Total Time (min): 30 min    Billable Minutes:  Evaluation 20  Therapeutic Activity 10    Diagnosis: Embolic stroke involving right middle cerebral artery     Past Medical History:   Diagnosis Date    Chronic back pain     Diabetes mellitus     Hypertension     Other and unspecified hyperlipidemia     Pain     chronic      Past Surgical History:   Procedure Laterality Date    BACK SURGERY      2011    NECK SURGERY      2012       Referring physician: Sho  Date referred to OT: 4/10  General Precautions: Standard, aspiration, fall, NPO  Orthopedic Precautions: N/A  Braces: N/A    Do you have any cultural, spiritual, Protestant conflicts, given your current situation?: Holiness     Patient History:  Prior level of function:   Patient resides in [place] alone in one story home with 5 steps to enter, rail on right.  PTA patient independent with ADLs; due to chronic back pain, on some days, patient required assistance with LB dressing and showering.  DME:  rolling walker and cane.  Patient reports using the cane around the house and using the walker when going out (ie: grocery store).  Patient is right handed.  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  Hobbies:  fishing and remodeling houses.   Roles/ Responsibilities:  Father (2), cooking, grocery shopping, yardwork.         Subjective:  Communicated with nurse prior to session.  Patient:  "I tried cutting the grass yesterday and my left arm went dead then them my left leg started acting crazy."  "I can't tell if the problem I have with my left shoulder is from the stroke or if it's the way it's always been.  I had rotator cuff surgery on my right shoulder and I need it on my left." "I had " "spasms in my right leg last night."  Pain Ratin/10   Location:  (right leg and back)  Pain Addressed: Nurse notified, Reposition  Pain Rating Post-Intervention: 8/10    Objective:  Patient found with: blood pressure cuff, peripheral IV, telemetry  Family not present.    Cognitive Exam:  Oriented to: Person, Place, Time and Situation  Follows Commands/attention: Follows one-step commands  Communication: clear/fluent  Memory:  No Deficits noted  Safety awareness/insight to disability: intact  Coping skills/emotional control: Appropriate to situation    Visual/perceptual:  Intact    Physical Exam:  Postural examination/scapula alignment: Rounded shoulder  Skin integrity: Visible skin intact  Edema: None noted     Sensation:   Intact    Upper Extremity Range of Motion:  Right Upper Extremity: WFL; limited 0-120 for shoulder flexion (previous injury)  Left Upper Extremity: limited 0-90 degrees for shoulder flexion (previous injury)    Upper Extremity Strength:  Right Upper Extremity: 5/5  Left Upper Extremity: 4/5    Functional Mobility:  Bed Mobility:  Rolling/Turning to Left: Modified independent  Rolling/Turning Right: Modified independent  Scooting/Bridging: Modified Independent  Supine to Sit: Modified Independent  Sit to Supine: Modified Independent    Transfers:  Sit <> Stand Assistance: Stand By Assistance  Sit <> Stand Assistive Device: No Assistive Device  Bed <> Chair Technique: Stand Pivot  Bed <> Chair Transfer Assistance: Stand By Assistance    Activities of Daily Living:  UE Dressing Level of Assistance: Stand by assistance  LE Dressing Level of Assistance: Stand by assistance  Grooming Position: Standing  Grooming Level of Assistance: Stand by assistance     Additional Treatment:   Patient education provided for stroke warning signs, prevention guidelines and personal risk factors.  Patient verbalizing understanding via teach back method.   Patient education provided on role of OT and need for HH upon " "discharge.   Patient verbalizing understanding via teach back method. Continued education, patient/ family training recommended.  Patient's functional status and disposition recommendation discussed with patient and nurse.  Patient alert and oriented x 3; able to follow 4/4 one step commands.  Patient attentive and interactive throughout the session.  Patient able to identify 5/5 body parts.  Able to name 5/5 objects.  Able to sequence 7/7 days of the week and 12/12 months of the year.  White board updated in patient's room.  OT asked if there were any other questions; patient/ family had no further questions.      AM-PAC 6 CLICK ADL  How much help from another person does this patient currently need?  1 = Unable, Total/Dependent Assistance  2 = A lot, Maximum/Moderate Assistance  3 = A little, Minimum/Contact Guard/Supervision  4 = None, Modified [place]/Independent    Putting on and taking off regular lower body clothing? : 3  Bathing (including washing, rinsing, drying)?: 3  Toileting, which includes using toilet, bedpan, or urinal? : 3  Putting on and taking off regular upper body clothing?: 3  Taking care of personal grooming such as brushing teeth?: 3  Eating meals?: 3  Total Score: 18    AM-PAC Raw Score CMS "G-Code Modifier Level of Impairment Assistance   6 % Total / Unable   7 - 9 CM 80 - 100% Maximal Assist   10 - 14 CL 60 - 80% Moderate Assist   15 - 19 CK 40 - 60% Moderate Assist   20 - 22 CJ 20 - 40% Minimal Assist   23 CI 1-20% SBA / CGA   24 CH 0% Independent/ Mod I       Patient left supine with all lines intact, call button in reach and bed alarm on    Assessment:  Sammy Bustos is a 51 y.o. male with a medical diagnosis of Embolic stroke involving right middle cerebral artery and presents with performance deficits of physical skills including impaired balance, mobility, strength, dexterity, fine motor coordination, gross motor coordination, pain and endurance.   These performance " deficits have resulted in activity limitations including but not limited to:  transfers, ascending/ descending stairs, walking short and long distances, walking around obstacles, transitional movement patterns (kneeling, bending); upper body dressing, lower body dressing, brushing teeth, toileting, bathing, carrying objects, shopping, and meal preparation.   Patient's role as independent caretaker for self has been affected. Patient will benefit from skilled OT services to maximize level of independence with self-care skills and functional mobility.  Will benefit from HH.    Rehab identified problem list/impairments: Rehab identified problem list/impairments: weakness, impaired endurance, pain, impaired functional mobilty, gait instability, impaired self care skills, impaired balance, decreased coordination, decreased lower extremity function, impaired fine motor, impaired coordination, decreased ROM    Rehab potential is good.    Activity tolerance: Good    Discharge recommendations: Discharge Facility/Level Of Care Needs: home health OT     Barriers to discharge: Barriers to Discharge: Decreased caregiver support, Inaccessible home environment    Equipment recommendations: bath bench     GOALS:   Occupational Therapy Goals        Problem: Occupational Therapy Goal    Goal Priority Disciplines Outcome Interventions   Occupational Therapy Goal     OT, PT/OT     Description:  Goals set 4/11 to be addressed for 7 days with expiration date, 4/18:  Patient will increase functional independence with ADLs by performing:    Patient will demonstrate supine -sit with modified independent.   Not met  Patient will demonstrate stand pivot transfers with modified independent.   Not met  Patient will demonstrate grooming while standing with modified independent.   Not met  Patient will demonstrate upper body dressing with modified independent.   Not met  Patient will demonstrate lower body dressing with modified independent.    Not met  Patient will demonstrate toileting with modified independent.   Not met  Patient will demonstrate bathing while seated EOB with modified independent.   Not met  Patient's family / caregiver will demonstrate independence and safety with assisting patient with self-care skills and functional mobility.     Not met  Patient and/or patient's family will verbalize understanding of stroke prevention guidelines, personal risk factors and stroke warning signs via teachback method.  Not met                     PLAN:  Patient to be seen 6 x/week to address the above listed problems via self-care/home management, therapeutic activities, therapeutic exercises, sensory integration, cognitive retraining, neuromuscular re-education  Plan of Care expires:  5/9  Plan of Care reviewed with: patient    OT G-codes  Functional Assessment Tool Used: FIM  Score: 5  Functional Limitation: Self care  Self Care Current Status (): HOA  Self Care Goal Status (): ATUL Sullivan  04/11/2017

## 2017-04-11 NOTE — CONSULTS
Neuro IR was consulted for this 50yo man transferred from [place] for a large right MCA stroke syndrome.  Initial NIHSS was reported to me as 25.  He received tPA.  I arrived to meet the patient at [facility] at 7:45PM, and found the pt. was brought directly to neuro critical care unit, bypassing the ED. On arrival to [facility], he has minimal symptoms, no weakness.  His creat is 2.1, so only NCCT was ordered.  This CT scan shows only minor white matter changes, no acute cortical infarct or hyperdense MCA.  No intervention is indicated at this time.  I recommend a non-contrast MRI bran and MRA tomorrow.

## 2017-04-11 NOTE — PLAN OF CARE
Problem: Patient Care Overview  Goal: Plan of Care Review  Outcome: Ongoing (interventions implemented as appropriate)  Recommendations     Recommendation/Intervention: 1) Continue Rx diet 2) Monitor labs as available 3) RD to follow  Goals: 1) Pt to meet >85% of EEN/EPN  Nutrition Goal Status: new  Communication of RD Recs: other (comment) (Discussed w/ patient )

## 2017-04-11 NOTE — NURSING
Returned from MRI, on cardiac monitor with 2 RNs.  No acute events, VSS, will continue to monitor.

## 2017-04-11 NOTE — CONSULTS
PM&R consult received.    Reason for consult:  assess rehabilitation needs    Reviewed patient history and current admission.      Evaluated by therapy.  Patient is SBA with mobility, transfers, functional ambulation, and ADLs.  Speech, language, and cognitive skills WFL/at baseline.  Patient near prior level of function and without rehab goals.  Agree with therapy recommendations for home health therapy.  Will sign off.  Please call with questions/concerns or re-consult if situation changes.    RONNIE Madrigal, FNP-C  Physical Medicine & Rehabilitation   04/12/2017  Spectralink: [id number]

## 2017-04-11 NOTE — PLAN OF CARE
Problem: Physical Therapy Goal  Goal: Physical Therapy Goal  Goals to be met by: 2017     Patient will increase functional independence with mobility by performin. Sit to stand transfer with Supervision using AD or No AD  2. Bed to chair transfer with Stand-by Assistance using AD or No AD  3. Gait x25 feet with Stand-by Assistance using AD or NO AD  4. Ascend/descend x5 stairs with right Handrails and Stand-by Assistance  5. Stand for x5 minutes with Supervision while performing dynamic balance tasks  6. Lower extremity exercise program x15 reps per handout, with supervision  Outcome: Ongoing (interventions implemented as appropriate)     PT Evaluation complete. Recommending  PT upon D/C.     Roslyn Toledo, PT, DPT  [phone]  10/5/2016

## 2017-04-11 NOTE — SUBJECTIVE & OBJECTIVE
Interval History:  Symptoms improving.  NAEON.    Review of Systems   Eyes: Negative for visual disturbance.   Respiratory: Negative for cough and shortness of breath.    Cardiovascular: Negative for chest pain and palpitations.   Gastrointestinal: Negative for nausea and vomiting.   Skin: Negative for rash and wound.   Neurological: Positive for speech difficulty and weakness.   Psychiatric/Behavioral: Negative for agitation and behavioral problems.       Objective:     Vitals:  Temp: 98.3 °F (36.8 °C) (04/11/17 1100)  Pulse: 65 (04/11/17 1400)  Resp: 16 (04/11/17 1400)  BP: (!) 124/58 (04/11/17 1400)  SpO2: 97 % (04/11/17 1500)    Temp:  [98.3 °F (36.8 °C)-98.9 °F (37.2 °C)] 98.3 °F (36.8 °C)  Pulse:  [] 65  Resp:  [16-25] 16  SpO2:  [96 %-99 %] 97 %  BP: (124-192)/(58-90) 124/58        04/10 0701 - 04/11 0700  In: 1675 [I.V.:675]  Out: 850 [Urine:850]    Physical Exam   Constitutional: He is oriented to person, place, and time. He appears well-developed and well-nourished. No distress.   HENT:   Head: Normocephalic and atraumatic.   Eyes: EOM are normal.   Cardiovascular: Normal rate.    Pulmonary/Chest: Effort normal.   Abdominal: Soft.   Neurological: He is alert and oriented to person, place, and time. GCS eye subscore is 4 - spontaneous. GCS verbal subscore is 5 - oriented. GCS motor subscore is 6 - obeys commands.   Skin: Skin is dry. No rash noted. He is not diaphoretic. No erythema.   Psychiatric: He has a normal mood and affect. His behavior is normal. Judgment and thought content normal.   Nursing note and vitals reviewed.      NEUROLOGICAL EXAMINATION:     MENTAL STATUS   Oriented to person, place, and time.   Attention: normal. Concentration: normal.   Level of consciousness: alert    CRANIAL NERVES     CN II   Visual fields full to confrontation.     CN III, IV, VI   Extraocular motions are normal.     CN VIII   Hearing: intact    MOTOR EXAM   Muscle bulk: normal  Overall muscle tone: normal        L sided drift         [place] Coma Scale:  Best Eye Response: 4 - spontaneous  Best Motor Response: 6 - obeys commands  Best Verbal Response: 5 - oriented  Keith Coma Scale Total: 15            Medications:  Continuous Scheduled  [START ON 4/12/2017] atorvastatin 40 mg Daily   carvedilol 12.5 mg BID WM   cloNIDine 0.1 mg BID   gabapentin 600 mg TID   nicotine 1 patch Daily   polyethylene glycol 17 g Daily   senna-docusate 8.6-50 mg 1 tablet BID   sodium chloride 0.9% 3 mL Q8H   PRN  acetaminophen 650 mg Q6H PRN   dextrose 50% 12.5 g PRN   glucagon (human recombinant) 1 mg PRN   flu vac [id number] 36mos up(PF) 0.5 mL vaccine x 1 dose   insulin aspart 1-10 Units Q6H PRN   ondansetron 4 mg Q12H PRN   oxycodone 5 mg Q6H PRN   pneumoc 13-torri conj-dip cr(PF) 0.5 mL vaccine x 1 dose     Today I personally reviewed pertinent medications, lines/drains/airways, imaging, cardiology, lab results,

## 2017-04-11 NOTE — PT/OT/SLP EVAL
"Physical Therapy  Evaluation    Sammy Bustos   MRN: [id number]   Admitting Diagnosis: Embolic stroke involving right middle cerebral artery    PT Received On: 04/11/17  PT Start Time: 0940     PT Stop Time: 1000    PT Total Time (min): 20 min       Billable Minutes:  Evaluation 20    Diagnosis: Embolic stroke involving right middle cerebral artery; s/p tPA    Past Medical History:   Diagnosis Date    Chronic back pain     Diabetes mellitus     Essential hypertension 10/28/2013    Hypertension     Other and unspecified hyperlipidemia     Pain     chronic      Past Surgical History:   Procedure Laterality Date    BACK SURGERY      2011    NECK SURGERY      2012     Referring physician: Selwyn  Date referred to PT: 4/10/2017    General Precautions: Standard, aspiration, fall     Do you have any cultural, spiritual, Orthodoxy conflicts, given your current situation?: Temple    Patient History:  Lives With: alone  Living Arrangements: house  Transportation Available: family or friend will provide  Living Environment Comment: Pt lives alone in [facility] with x5 DUDLEY with rail on R. PTA was mod (I) with mobility using Rollator and Cane. Pt reports no falls in the home. Pt's daughter lives close by and is able to assist upon D/C.  Equipment Currently Used at Home: cane, straight, rollator  DME owned (not currently used): none    Previous Level of Function:  Ambulation Skills: needs device  Transfer Skills: needs device  ADL Skills: needs device  Work/Leisure Activity: needs device    Subjective:  Communicated with RN (Saulo) prior to session.    Chief Complaint: "I live with the pain."  Patient goals: To see his grandson    Pain Rating:  (Did not rate; pt reports chronic pain/cramping)   Pain Rating Post-Intervention:  (NAD; resting quietly)    Objective:   Patient found with: blood pressure cuff, peripheral IV, telemetry, SCD, pulse ox (continuous), bed alarm     Cognitive Exam:  Oriented to: Person, Place, Time and " Situation    Follows Commands/attention: Follows multistep  commands  Communication: clear/fluent  Safety awareness/insight to disability: intact    Physical Exam:  Postural examination/scapula alignment: Rounded shoulder, Head forward and Posterior pelvic tilt    Skin integrity: multiple scars noted to back and B LE 2/2 surgeries  Edema: None noted    Sensation:   Intact; however, sensory disturbances noted 2/2 chronic LBP    Lower Extremity Range of Motion:  Right Lower Extremity: WFL  Left Lower Extremity: WFL    Lower Extremity Strength: Grossly 4/5 throughout B LE: PF on R foot 3+/5 2/2 cramping  Right Lower Extremity: WFL  Left Lower Extremity: WFL     Fine motor coordination:  Intact; delayed    Gross motor coordination: WFL    Functional Mobility:  Bed Mobility: Mod (I); increased time    Transfers:  Sit <> Stand Assistance: Stand By Assistance  Sit <> Stand Assistive Device: No Assistive Device    Gait:   Gait Distance: Pt took x3-4 side steps toward HOB with SBA for line mngmnt.   Assistance 1: Stand by Assistance  Gait Assistive Device: No device  Gait Pattern: 2-point gait  Gait Deviation(s): decreased patti, foot flat    Balance:   Static Sit: GOOD: Takes MODERATE challenges from all directions  Dynamic Sit: GOOD: Maintains balance through MODERATE excursions of active trunk movement  Static Stand: FAIR: Maintains without assist but unable to take challenges  Dynamic stand: FAIR: Needs CONTACT GUARD during gait    Therapeutic Activities and Exercises:  PT arrived to pt's room to find pt resting quietly; agreeable to PT. Pt reports chronic low back pain and generalized cramping in legs to be a factor in need for use of cane/rollator. Pt tolerated EOB sitting and standing without increased pain; however, reported dizziness upon standing. Pt's daughter/grandson arrived upon return to supine. Questions/concerns addressed within PT scope of practice. Pt in agreement with continued therapy  services.    AM-PAC 6 CLICK MOBILITY  How much help from another person does this patient currently need?   1 = Unable, Total/Dependent Assistance  2 = A lot, Maximum/Moderate Assistance  3 = A little, Minimum/Contact Guard/Supervision  4 = None, Modified [place]/Independent    Turning over in bed (including adjusting bedclothes, sheets and blankets)?: 4  Sitting down on and standing up from a chair with arms (e.g., wheelchair, bedside commode, etc.): 4  Moving from lying on back to sitting on the side of the bed?: 4  Moving to and from a bed to a chair (including a wheelchair)?: 4  Need to walk in hospital room?: 3  Climbing 3-5 steps with a railing?: 3  Total Score: 22     AM-PAC Raw Score CMS G-Code Modifier Level of Impairment Assistance   6 % Total / Unable   7 - 9 CM 80 - 100% Maximal Assist   10 - 14 CL 60 - 80% Moderate Assist   15 - 19 CK 40 - 60% Moderate Assist   20 - 22 CJ 20 - 40% Minimal Assist   23 CI 1-20% SBA / CGA   24 CH 0% Independent/ Mod I     Patient left HOB elevated with all lines intact, call button in reach, RN notified and family present.    Assessment:   Sammy Bustos is a 51 y.o. male with a medical diagnosis of Embolic stroke involving right middle cerebral artery and presents with generalized weakness and B LE discomfort 2/2 chronic pain. Pt demo mod (I) with bed mobility, SBA to come to standing and maintain standing, and SBA for taking steps toward HOB. Pt reports improved symptoms and near resolution of symptoms. Will benefit from HH PT; discussed pain management PT in outpatient setting. Patient will benefit from continued PT services to address:    Rehab identified problem list/impairments: Rehab identified problem list/impairments: weakness, impaired endurance, impaired self care skills, impaired sensation, impaired functional mobilty, gait instability, impaired balance, decreased lower extremity function, pain, decreased safety awareness    Rehab potential is  good.    Activity tolerance: Good    Discharge recommendations: Discharge Facility/Level Of Care Needs: home health PT     Barriers to discharge: Barriers to Discharge: None, Decreased caregiver support    Equipment recommendations: Equipment Needed After Discharge: bath bench     GOALS:   Physical Therapy Goals        Problem: Physical Therapy Goal    Goal Priority Disciplines Outcome Goal Variances Interventions   Physical Therapy Goal     PT/OT, PT Ongoing (interventions implemented as appropriate)     Description:  Goals to be met by: 2017     Patient will increase functional independence with mobility by performin. Sit to stand transfer with Supervision using AD or No AD  2. Bed to chair transfer with Stand-by Assistance using AD or No AD  3. Gait x25 feet with Stand-by Assistance using AD or NO AD  4. Ascend/descend x5 stairs with right Handrails and Stand-by Assistance  5. Stand for x5 minutes with Supervision while performing dynamic balance tasks  6. Lower extremity exercise program x15 reps per handout, with supervision              PLAN:    Patient to be seen 5 x/week to address the above listed problems via gait training, therapeutic activities, therapeutic exercises, neuromuscular re-education  Plan of Care expires: 05/10/17  Plan of Care reviewed with: patient    Roslyn MARSHALL Toledo, PT, DPT  [phone]  2017

## 2017-04-11 NOTE — PLAN OF CARE
Problem: Occupational Therapy Goal  Goal: Occupational Therapy Goal  Goals set 4/11 to be addressed for 7 days with expiration date, 4/18:  Patient will increase functional independence with ADLs by performing:    Patient will demonstrate supine -sit with modified independent. Not met  Patient will demonstrate stand pivot transfers with modified independent. Not met  Patient will demonstrate grooming while standing with modified independent. Not met  Patient will demonstrate upper body dressing with modified independent. Not met  Patient will demonstrate lower body dressing with modified independent. Not met  Patient will demonstrate toileting with modified independent. Not met  Patient will demonstrate bathing while seated EOB with modified independent. Not met  Patients family / caregiver will demonstrate independence and safety with assisting patient with self-care skills and functional mobility. Not met  Patient and/or patients family will verbalize understanding of stroke prevention guidelines, personal risk factors and stroke warning signs via teachback method. Not met   OT evaluation completed.  Recommending HH.  ATUL Barriga  4/11/2017

## 2017-04-11 NOTE — SUBJECTIVE & OBJECTIVE
Past Medical History:   Diagnosis Date    Chronic back pain     Diabetes mellitus     Hypertension     Other and unspecified hyperlipidemia     Pain     chronic     Past Surgical History:   Procedure Laterality Date    BACK SURGERY      2011    NECK SURGERY      2012     Family History   Problem Relation Age of Onset    Heart disease Mother     Diabetes Mother     Heart disease Father      Social History   Substance Use Topics    Smoking status: Current Every Day Smoker     Packs/day: 0.50     Years: 15.00     Types: Cigarettes    Smokeless tobacco: Never Used    Alcohol use No     Review of patient's allergies indicates:   Allergen Reactions    Valium [diazepam]      Medications: I have reviewed the current medication administration record.    Prescriptions Prior to Admission   Medication Sig Dispense Refill Last Dose    atorvastatin (LIPITOR) 10 MG tablet Take 10 mg by mouth once daily.   Taking    benazepril (LOTENSIN) 20 MG tablet Take 20 mg by mouth once daily.   Taking    carisoprodol (SOMA) 350 MG tablet Take 350 mg by mouth 4 (four) times daily as needed.   Taking    carvedilol (COREG) 12.5 MG tablet Take 12.5 mg by mouth 2 (two) times daily with meals.   Taking    clonidine (CATAPRES) 0.2 MG tablet Take 0.2 mg by mouth 2 (two) times daily.   Taking    gabapentin (NEURONTIN) 600 MG tablet Take 600 mg by mouth 3 (three) times daily.   Taking    metformin (GLUCOPHAGE) 500 MG tablet Take 500 mg by mouth 2 (two) times daily with meals.   Taking    nitroGLYCERIN 0.4 MG/DOSE TL SPRY (NITROLINGUAL) 0.4 mg/dose spray Place 1 spray under the tongue every 5 (five) minutes as needed.   Taking    olmesartan-amlodipine-hctz (TRIBENZOR) 40-10-25 mg Tab Take by mouth.   Taking    oxycodone (OXY-IR) 5 mg Cap Take 10 mg by mouth every 4 (four) hours as needed.   Taking    oxycodone (ROXICODONE) 30 MG Tab Take 5 mg by mouth every 6 (six) hours as needed.   Taking       Review of Systems    Constitutional: Negative for chills, fatigue and fever.   HENT: Negative for facial swelling.    Eyes: Negative for visual disturbance.   Respiratory: Negative for cough and shortness of breath.    Cardiovascular: Negative for chest pain.   Gastrointestinal: Negative for abdominal distention and abdominal pain.   Genitourinary: Negative for dysuria.   Musculoskeletal: Negative for arthralgias and back pain.   Skin: Negative for color change.   Neurological: Negative for dizziness, seizures and numbness.   Psychiatric/Behavioral: Negative for agitation.     Objective:     Vital Signs (Most Recent):  Temp: 98.8 °F (37.1 °C) (04/10/17 2300)  Pulse: 84 (04/10/17 2300)  Resp: 16 (04/10/17 2300)  BP: (!) 146/66 (04/10/17 2300)  SpO2: 98 % (04/10/17 2300)    Vital Signs Range (Last 24H):  Temp:  [98.6 °F (37 °C)-98.8 °F (37.1 °C)]   Pulse:  []   Resp:  [16-23]   BP: (131-192)/(66-90)   SpO2:  [96 %-99 %]     Physical Exam    Neurological Exam:   LOC: alert and follows requests  Language: No aphasia  Speech: Dysarthria  Orientation: Person, Place, Time  EOM (CN III, IV, VI): Full/intact  Pupils (CN III, IV, VI): PERRL  Facial Sensation (CN V): Symmetric  Facial Movement (CN VII): lower weakness left lower  Tongue (CN XII): to midline  Motor*: Arm Left:  Paretic:  4/5, Leg Left:   Paretic:  4/5, Arm Right:   Normal (5/5), Leg Right:   Normal (5/5)  Cerebellar*: Normal limb  Sensation: michell-hypoesthesia left  Tone: Arm-Left: normal; Leg-Left: normal; Arm-Right: normal; Leg-Right: normal    NIH Stroke Scale:  Interval: baseline (upon arrival/admit)  Level of Consciousness: 0 - alert  LOC Questions: 0 - answers both correctly  LOC Commands: 0 - performs both correctly  Best Gaze: 0 - normal  Visual: 0 - no visual loss  Facial Palsy: 1 - minor  Motor Left Arm: 1 - drift  Motor Right Arm: 0 - no drift  Motor Left Le - drift  Motor Right Le - no drift  Limb Ataxia: 0 - absent  Sensory: 1 - mild to moderate  loss  Best Language: 0 - no aphasia  Dysarthria: 0 - normal articulation  Extinction and Inattention: 0 - no neglect  NIH Stroke Scale Total: 4  Modified [place] Scale:   Timeline: Prior to symptoms onset  Modified [place] Score: 0 - no symptoms        Laboratory:  CMP:   Recent Labs  Lab 04/10/17  2155   CALCIUM 8.4*   ALBUMIN 3.5   PROT 7.0      K 3.4*   CO2 20*      BUN 18   CREATININE 1.6*   ALKPHOS 84   ALT 21   AST 31   BILITOT 0.4     BMP:   Recent Labs  Lab 04/10/17  2155      K 3.4*      CO2 20*   BUN 18   CREATININE 1.6*   CALCIUM 8.4*     CBC:   Recent Labs  Lab 04/10/17  2057   WBC 12.32   RBC 5.72   HGB 15.6   HCT 44.5      MCV 78*   MCH 27.3   MCHC 35.1     Lipid Panel:   Recent Labs  Lab 04/10/17  2155   CHOL 180   LDLCALC 127.0   HDL 38*   TRIG 75     Platelet Aggregation Study: No results for input(s): PLTAGG, PLTAGINTERP, PLTAGREGLACO, ADPPLTAGGREG in the last 168 hours.  Hgb A1C: No results for input(s): HGBA1C in the last 168 hours.  TSH:   Recent Labs  Lab 04/10/17  2155   TSH 0.187*       Diagnostic Results:  Brain Imaging:   CT head without contrast (4/10/17)  Questionable area of hypo-attentuation; anterior R MCA territory

## 2017-04-11 NOTE — CONSULTS
[facility]  Adult Nutrition  Consult Note    SUMMARY     Recommendations    Recommendation/Intervention: 1) Continue Rx diet  2)  Monitor labs as available  3) RD to follow  Goals: 1) Pt to meet >85% of EEN/EPN  Nutrition Goal Status: new  Communication of RD Recs: other (comment) (Discussed w/ patient )    Continuum of Care Plan      Reason for Assessment    Reason for Assessment: nurse/nurse practitioner consult  Diagnosis: stroke/CVA  Relevent Medical History: HTN,DM2   Interdisciplinary Rounds: did not attend     General Information Comments: Visited pt at meal time; consumed 100% and numerous family members at bedside    Nutrition Prescription Ordered    Current Diet Order: 1800 Diabetic, think liquids  Nutrition Order Comments: Pt consuming 100% of meals      Evaluation of Received Nutrients/Fluid Intake       Nutrition Risk Screen     Nutrition Risk Screen: no indicators present    Nutrition/Diet History    Patient Reported Diet/Restrictions/Preferences: general, diabetic diet  Typical Food/Fluid Intake: Adequate per patient  Food Preferences: Reviewed    Labs/Tests/Procedures/Meds       Pertinent Labs Reviewed: reviewed, pertinent  Pertinent Labs Comments: Glucose 131, PO4 5.7  Pertinent Medications Reviewed: reviewed, pertinent  Pertinent Medications Comments: statin,clonidine, stool softener    Physical Findings          Oral/Mouth Cavity: tooth/teeth missing  Skin: intact    Anthropometrics    Height Method: Stated  Height (inches): 70.98 in  Weight Method: Standard Scale  Weight (kg): 65.6 kg  Ideal Body Weight (IBW), Male: 171.88 lb     % Ideal Body Weight, Male (lb): 84.14 lb     BMI (kg/m2): 20.18  BMI Grade: 18.5-24.9 - normal    Anthropometrics (Special Considerations)      Estimated/Assessed Needs    Weight Used For Calorie Calculations: 65.6 kg (144 lb 10 oz) (25-30kcals/kg/BW)   Height (cm): 180.3 cm     Energy Need Method: Kcal/kg, other (see comments) (25-30kcals/kg/BW)      1650-2000kcals  RMR ([place]-St. Jeor Equation): 1536.3        Weight Used For Protein Calculations: 65.6 kg (144 lb 10 oz) (0.8-1.0gmsProtein/kg/BW)  Protein Requirements: 55-65 (10.8-1.0gmsProtein/kg/BW)    Fluid Need Method: other (see comments) (1ml/1kcal or MD Rx)    1650-1800ml Fluid/day      Monitor and Evaluation    Food and Nutrient Intake: food and beverage intake  Food and Nutrient Adminstration: diet order     Physical Activity and Function: nutrition-related ADLs and IADLs  Anthropometric Measurements: weight, weight change  Biochemical Data, Medical Tests and Procedures: electrolyte and renal panel, gastrointestinal profile, glucose/endocrine profile, inflammatory profile  Nutrition-Focused Physical Findings: overall appearance    Nutrition Risk    Level of Risk: other (see comments) (follow up once weekly)    Nutrition Follow-Up    RD Follow-up?: Yes    Assessment and Plan    No nutrition diagnosis at this time.

## 2017-04-11 NOTE — PLAN OF CARE
Problem: Patient Care Overview  Goal: Plan of Care Review  Outcome: Ongoing (interventions implemented as appropriate)  POC reviewed with pt at 0230. Pt verbalized understanding. Questions and concerns addressed. No acute events overnight. Pt progressing toward goals. Will continue to monitor. See flowsheets for full assessment and VS info

## 2017-04-11 NOTE — NURSING
Pt complaining of headache, notified Deanna Nettles MD. Orders received for tylenol PRN.  Will give and continue to monitor.

## 2017-04-11 NOTE — CONSULTS
[facility]  Vascular Neurology  Comprehensive Stroke Center  Consult Note      Inpatient consult to Vascular (Stroke) Neurology  Consult performed by: BRUCE TREADWELL  Consult ordered by: PAVEL TEE        Subjective:     History of Present Illness:  Pt is a 51 yr old male with PMHx of HTN, DMII, HLD, CAD (s/p PCI), PAD (s/p stents) who presented as a transfer from OSH with concerns of R MCA syndrome, s/p tpa. Patient reports around 2pm this afternoon having symptoms of acute onset left arm weakness, followed by left leg weakness. Patient transferred to hospital in [place], tpa administered and transferred here for possible thrombectomy. Per chart review; NIH at OSH prior to tpa reported to 24; however most of symptoms had resolved on admit here.          Past Medical History:   Diagnosis Date    Chronic back pain     Diabetes mellitus     Hypertension     Other and unspecified hyperlipidemia     Pain     chronic     Past Surgical History:   Procedure Laterality Date    BACK SURGERY      2011    NECK SURGERY      2012     Family History   Problem Relation Age of Onset    Heart disease Mother     Diabetes Mother     Heart disease Father      Social History   Substance Use Topics    Smoking status: Current Every Day Smoker     Packs/day: 0.50     Years: 15.00     Types: Cigarettes    Smokeless tobacco: Never Used    Alcohol use No     Review of patient's allergies indicates:   Allergen Reactions    Valium [diazepam]      Medications: I have reviewed the current medication administration record.    Prescriptions Prior to Admission   Medication Sig Dispense Refill Last Dose    atorvastatin (LIPITOR) 10 MG tablet Take 10 mg by mouth once daily.   Taking    benazepril (LOTENSIN) 20 MG tablet Take 20 mg by mouth once daily.   Taking    carisoprodol (SOMA) 350 MG tablet Take 350 mg by mouth 4 (four) times daily as needed.   Taking    carvedilol (COREG) 12.5 MG tablet Take  12.5 mg by mouth 2 (two) times daily with meals.   Taking    clonidine (CATAPRES) 0.2 MG tablet Take 0.2 mg by mouth 2 (two) times daily.   Taking    gabapentin (NEURONTIN) 600 MG tablet Take 600 mg by mouth 3 (three) times daily.   Taking    metformin (GLUCOPHAGE) 500 MG tablet Take 500 mg by mouth 2 (two) times daily with meals.   Taking    nitroGLYCERIN 0.4 MG/DOSE TL SPRY (NITROLINGUAL) 0.4 mg/dose spray Place 1 spray under the tongue every 5 (five) minutes as needed.   Taking    olmesartan-amlodipine-hctz (TRIBENZOR) 40-10-25 mg Tab Take by mouth.   Taking    oxycodone (OXY-IR) 5 mg Cap Take 10 mg by mouth every 4 (four) hours as needed.   Taking    oxycodone (ROXICODONE) 30 MG Tab Take 5 mg by mouth every 6 (six) hours as needed.   Taking       Review of Systems   Constitutional: Negative for chills, fatigue and fever.   HENT: Negative for facial swelling.    Eyes: Negative for visual disturbance.   Respiratory: Negative for cough and shortness of breath.    Cardiovascular: Negative for chest pain.   Gastrointestinal: Negative for abdominal distention and abdominal pain.   Genitourinary: Negative for dysuria.   Musculoskeletal: Negative for arthralgias and back pain.   Skin: Negative for color change.   Neurological: Negative for dizziness, seizures and numbness.   Psychiatric/Behavioral: Negative for agitation.     Objective:     Vital Signs (Most Recent):  Temp: 98.8 °F (37.1 °C) (04/10/17 2300)  Pulse: 84 (04/10/17 2300)  Resp: 16 (04/10/17 2300)  BP: (!) 146/66 (04/10/17 2300)  SpO2: 98 % (04/10/17 2300)    Vital Signs Range (Last 24H):  Temp:  [98.6 °F (37 °C)-98.8 °F (37.1 °C)]   Pulse:  []   Resp:  [16-23]   BP: (131-192)/(66-90)   SpO2:  [96 %-99 %]     Physical Exam    Neurological Exam:   LOC: alert and follows requests  Language: No aphasia  Speech: Dysarthria  Orientation: Person, Place, Time  EOM (CN III, IV, VI): Full/intact  Pupils (CN III, IV, VI): PERRL  Facial Sensation (CN V):  Symmetric  Facial Movement (CN VII): lower weakness left lower  Tongue (CN XII): to midline  Motor*: Arm Left:  Paretic:  4/5, Leg Left:   Paretic:  4/5, Arm Right:   Normal (5/5), Leg Right:   Normal (5/5)  Cerebellar*: Normal limb  Sensation: michell-hypoesthesia left  Tone: Arm-Left: normal; Leg-Left: normal; Arm-Right: normal; Leg-Right: normal    NIH Stroke Scale:  Interval: baseline (upon arrival/admit)  Level of Consciousness: 0 - alert  LOC Questions: 0 - answers both correctly  LOC Commands: 0 - performs both correctly  Best Gaze: 0 - normal  Visual: 0 - no visual loss  Facial Palsy: 1 - minor  Motor Left Arm: 1 - drift  Motor Right Arm: 0 - no drift  Motor Left Le - drift  Motor Right Le - no drift  Limb Ataxia: 0 - absent  Sensory: 1 - mild to moderate loss  Best Language: 0 - no aphasia  Dysarthria: 0 - normal articulation  Extinction and Inattention: 0 - no neglect  NIH Stroke Scale Total: 4  Modified [place] Scale:   Timeline: Prior to symptoms onset  Modified [place] Score: 0 - no symptoms        Laboratory:  CMP:   Recent Labs  Lab 04/10/17  2155   CALCIUM 8.4*   ALBUMIN 3.5   PROT 7.0      K 3.4*   CO2 20*      BUN 18   CREATININE 1.6*   ALKPHOS 84   ALT 21   AST 31   BILITOT 0.4     BMP:   Recent Labs  Lab 04/10/17  2155      K 3.4*      CO2 20*   BUN 18   CREATININE 1.6*   CALCIUM 8.4*     CBC:   Recent Labs  Lab 04/10/17  2057   WBC 12.32   RBC 5.72   HGB 15.6   HCT 44.5      MCV 78*   MCH 27.3   MCHC 35.1     Lipid Panel:   Recent Labs  Lab 04/10/17  2155   CHOL 180   LDLCALC 127.0   HDL 38*   TRIG 75     Platelet Aggregation Study: No results for input(s): PLTAGG, PLTAGINTERP, PLTAGREGLACO, ADPPLTAGGREG in the last 168 hours.  Hgb A1C: No results for input(s): HGBA1C in the last 168 hours.  TSH:   Recent Labs  Lab 04/10/17  2155   TSH 0.187*       Diagnostic Results:  Brain Imaging:   CT head without contrast (4/10/17)  Questionable area of hypo-attentuation;  anterior R MCA territory       Assessment/Plan:     Patient is a 51 y.o. year old male with:    * Embolic stroke involving right middle cerebral artery  Per history, reported to have had cortical signs on exam at OSH; dense R MCA sign; s/p tpa; much improved on admit - no cortical signs noted.   Antithrombotics for secondary stroke prevention: None: Reason:  Hold all Antithrombotics x 24 hours after IV t-PA administration,   Statins for secondary stroke prevention and hyperlipidemia, if present: Atorvastatin- 40 mg oral daily,   Aggressive risk factor modification: Hypertension, Diabetes and High Cholesterol, Rehab Efforts: Physical Therapy, Occupational Therapy and Speech and Language Pathology,   Diagnostics: Ordered/Pending MRA neck/arch to assess vasculature, MRI head without contrast to assess brain parenchyma, Trans-thoracic cardiac echo to assess cardiac function/status, VTE Prophylaxis: None: Reason for No Pharmacological VTE Prophylaxis: Mechanical prophylaxis: Place SCDs, BP Parameters: SBP <180 and Nursing Orders: Neuro checks- q1h, Complete THALIA unless evaluated by speech, Seizure precautions, Out of bed BID, Avoid Osborne catheter, Pneumatic compression device, Stroke Education, Blood glucose target 100-130, Up with assistance    Hyperlipidemia  - Atorvastatin 40mg qd    HTN (hypertension)  - stroke risk factor  - SBP <180 post tpa    Type 2 diabetes mellitus, without long-term current use of insulin  - stroke risk factor    Tissue plasminogen activator (t-PA) administered at other facility within 24 hours prior to current admission  - hold all anti- thrombotics 24 hrs post tpa  - SBP goal <180    Cytotoxic cerebral edema  Noted on imaging    Spastic hemiplegia affecting left nondominant side  - much improved post tpa  - PT/OT evaluate and treat    Facial droop  Left facial droop  2/2 stroke        Starr Marcelo MD  Comprehensive Stroke Center  Department of Vascular Neurology   [facility]  [place]

## 2017-04-11 NOTE — ASSESSMENT & PLAN NOTE
Per history, reported to have had cortical signs on exam at OSH; dense R MCA sign; s/p tpa; much improved on admit - no cortical signs noted.   Antithrombotics for secondary stroke prevention: None: Reason:  Hold all Antithrombotics x 24 hours after IV t-PA administration,   Statins for secondary stroke prevention and hyperlipidemia, if present: Atorvastatin- 40 mg oral daily,   Aggressive risk factor modification: Hypertension, Diabetes and High Cholesterol, Rehab Efforts: Physical Therapy, Occupational Therapy and Speech and Language Pathology,   Diagnostics: Ordered/Pending MRA neck/arch to assess vasculature, MRI head without contrast to assess brain parenchyma, Trans-thoracic cardiac echo to assess cardiac function/status, VTE Prophylaxis: None: Reason for No Pharmacological VTE Prophylaxis: Mechanical prophylaxis: Place SCDs, BP Parameters: SBP <180 and Nursing Orders: Neuro checks- q1h, Complete THALIA unless evaluated by speech, Seizure precautions, Out of bed BID, Avoid Osborne catheter, Pneumatic compression device, Stroke Education, Blood glucose target 100-130, Up with assistance

## 2017-04-11 NOTE — PT/OT/SLP EVAL
"Speech Language Pathology Evaluation    Sammy Bustos   MRN: [id number]   Admitting Diagnosis: Embolic stroke involving right middle cerebral artery    Diet recommendations: Solid Diet Level: Dental Soft  Liquid Diet Level: Thin Alternating bites/sips    SLP Treatment Date: 17  Speech Start Time: 846     Speech Stop Time: 910     Speech Total (min): 24 min       TREATMENT BILLABLE MINUTES:  Eval 16  and Eval Swallow and Oral Function 8    Diagnosis: Embolic stroke involving right middle cerebral artery      Past Medical History:   Diagnosis Date    Chronic back pain     Diabetes mellitus     Hypertension     Other and unspecified hyperlipidemia     Pain     chronic     Past Surgical History:   Procedure Laterality Date    BACK SURGERY      2011    NECK SURGERY             Has the patient been evaluated by SLP for swallowing? : Yes  Keep patient NPO?: No   General Precautions: Standard, aspiration, fall          Social Hx: Patient lives alone    Prior diet: regular/thin but pt reports difficulty with meats and stated he often avoided chewy meats    Occupational/hobbies/homemaking: retired; 11th grade    Subjective:  " I don't take that medicine anymore" when discussing medications with his nurse  Patient goals: go home    Pain Ratin/10        Location: leg  Pain Addressed: Reposition, Distraction, Nurse notified  Pain Rating Post-Intervention: /10    Objective:        Oral Musculature Evaluation  Oral Musculature: WFL  Dentition:  (has only botton teeth)  Mucosal Quality: good  Mandibular Strength and Mobility: WFL  Oral Labial Strength and Mobility: WFL  Lingual Strength and Mobility: WFL  Velar Elevation: WFL  Buccal Strength and Mobility: WFL  Volitional Cough: adequate  Voice Prior to PO Intake: clear     Cognitive Status:  Behavioral Observations: alert and appropriate-  Memory and Orientation: ox4; good recall of recent and remote events; good recall of medications; he recalled up to 5 digits " and words; after 10 min, he recalled 0/3 objects and with cues 3/3  Attention: good  Problem Solving: wfl and able to generate multiple solutions; he compared/contrasted items and completed categorizational task with 100% acc; he sequenced up to 4 items  Pragmatics: [facility]  Executive Function: [facility]    Language: yes    Auditory Comprehension: [facility] for complex questions/commands    Verbal Expression: wfl; named 16 items during word fluency task and named 6/6 pictured items    Motor Speech: wfl    Voice: wfl    Augmentative Alternative Communication: no    Reading: to be tested    Writing: to be tested    Visual-Spatial: to be tested      Bedside Swallow Eval:  Consistencies Assessed: Thin liquids cup and Solids sausage  Oral Phase: excess chewing  Pharyngeal Phase: no overt clinical signs/symptoms of pharyngeal dysphagia    Additional Treatment:    Upon entering the room, pt finishing his breakfast. Pt chewing on a piece of sausage which he reported trouble with. Thin liquids given but pt unable to clear. Pt eventually had to orally expell. Pt reported he does not wear upper dentures and avoids chewy meats at home. Discussed changing diet to dental soft and pt agreed. Observed pt taking medications without difficulty.   White board updated and poc reviewed. Several interruption noted during session and session ended early due to  MD present. Will complete eval tomorrow.     FIM:  Social Interaction: 7 Complete [place]--The patient interacts appropriately with staff, other patients, and family members (e.g., controls temper, accepts criticism, is aware that words and actions have an impact on others), and does not require medication for   Problem Solvin Modified [place]--In most situations, the patient recognizes a present problem, and with only mild difficulty makes appropriate decisions, initiates and carries out a sequence of steps to solve complex problems, or requires more than a   Comprehension: 7  Complete [place]--The patient understand complex or abstract directions and conversation, and understand either spoken or written language (not necessarily English).   Expression: 7 Complete [place]--The patient expresses complex or abstract ideas clearly and fluently (not necessarily in English).   Memory: 6 Modified [place]--The patient appears to have only mild difficulty recognizing people frequently encountered, remembering daily routines, and responding to requests of others.  The patient may use  self-initiated or environmental cues, prompts,     Assessment:  Sammy Bustos is a 51 y.o. male with a SLP diagnosis of  Embolic stroke involving right middle cerebral artery. He present with no deficits for everyday speech,lang, or cognition. Will follow up to further assess reading/writing and visual spatial skills. Pt with mild oral dysphagia due to no upper dentures.          Discharge recommendations: Discharge Facility/Level Of Care Needs: other (see comments) (TBD after compeltion of eval)     Goals:   SLP Goals        Problem: SLP Goal    Goal Priority Disciplines Outcome   SLP Goal     SLP    Description:   Goals to be met 4/18  1 Pt will tolerate soft diet and thin liquids  2 Pt will participate in further eval of reading/writing and visual spatial skills to determine goals  3 Pt will participate in further higher level cognitive eval including math/time to determine goals               Plan:   Patient to be seen Therapy Frequency: 5 x/week   Plan of Care expires:    Plan of Care reviewed with: patient  SLP Follow-up?: Yes              JOSELIN Camacho, CCC-SLP  04/11/2017

## 2017-04-12 VITALS
SYSTOLIC BLOOD PRESSURE: 178 MMHG | DIASTOLIC BLOOD PRESSURE: 76 MMHG | TEMPERATURE: 99 F | HEART RATE: 76 BPM | WEIGHT: 152.56 LBS | BODY MASS INDEX: 21.36 KG/M2 | HEIGHT: 71 IN | RESPIRATION RATE: 22 BRPM | OXYGEN SATURATION: 100 %

## 2017-04-12 PROBLEM — R09.89 BRUIT: Status: ACTIVE | Noted: 2017-04-12

## 2017-04-12 PROBLEM — I63.9 CVA (CEREBRAL VASCULAR ACCIDENT): Status: ACTIVE | Noted: 2017-04-12

## 2017-04-12 LAB
ALBUMIN SERPL BCP-MCNC: 3.3 G/DL
ALP SERPL-CCNC: 79 U/L
ALT SERPL W/O P-5'-P-CCNC: 19 U/L
ANION GAP SERPL CALC-SCNC: 9 MMOL/L
AST SERPL-CCNC: 30 U/L
BASOPHILS # BLD AUTO: 0.01 K/UL
BASOPHILS NFR BLD: 0.2 %
BILIRUB SERPL-MCNC: 0.6 MG/DL
BUN SERPL-MCNC: 16 MG/DL
CALCIUM SERPL-MCNC: 8.9 MG/DL
CHLORIDE SERPL-SCNC: 104 MMOL/L
CO2 SERPL-SCNC: 26 MMOL/L
CREAT SERPL-MCNC: 0.9 MG/DL
DIFFERENTIAL METHOD: ABNORMAL
EOSINOPHIL # BLD AUTO: 0.1 K/UL
EOSINOPHIL NFR BLD: 1.6 %
ERYTHROCYTE [DISTWIDTH] IN BLOOD BY AUTOMATED COUNT: 13.4 %
EST. GFR  (AFRICAN AMERICAN): >60 ML/MIN/1.73 M^2
EST. GFR  (NON AFRICAN AMERICAN): >60 ML/MIN/1.73 M^2
GLUCOSE SERPL-MCNC: 127 MG/DL
HCT VFR BLD AUTO: 42.5 %
HGB BLD-MCNC: 14.1 G/DL
LYMPHOCYTES # BLD AUTO: 2.7 K/UL
LYMPHOCYTES NFR BLD: 51.9 %
MAGNESIUM SERPL-MCNC: 2 MG/DL
MCH RBC QN AUTO: 26.9 PG
MCHC RBC AUTO-ENTMCNC: 33.2 %
MCV RBC AUTO: 81 FL
MONOCYTES # BLD AUTO: 0.4 K/UL
MONOCYTES NFR BLD: 7.4 %
NEUTROPHILS # BLD AUTO: 2 K/UL
NEUTROPHILS NFR BLD: 38.9 %
PHOSPHATE SERPL-MCNC: 2.5 MG/DL
PLATELET # BLD AUTO: 162 K/UL
PMV BLD AUTO: 10.1 FL
POCT GLUCOSE: 104 MG/DL (ref 70–110)
POTASSIUM SERPL-SCNC: 4 MMOL/L
PROT SERPL-MCNC: 6.8 G/DL
RBC # BLD AUTO: 5.24 M/UL
SODIUM SERPL-SCNC: 139 MMOL/L
WBC # BLD AUTO: 5.14 K/UL

## 2017-04-12 PROCEDURE — 92507 TX SP LANG VOICE COMM INDIV: CPT

## 2017-04-12 PROCEDURE — 80053 COMPREHEN METABOLIC PANEL: CPT

## 2017-04-12 PROCEDURE — 93880 EXTRACRANIAL BILAT STUDY: CPT | Performed by: SURGERY

## 2017-04-12 PROCEDURE — 25000003 PHARM REV CODE 250: Performed by: STUDENT IN AN ORGANIZED HEALTH CARE EDUCATION/TRAINING PROGRAM

## 2017-04-12 PROCEDURE — 25000003 PHARM REV CODE 250: Performed by: NURSE PRACTITIONER

## 2017-04-12 PROCEDURE — 99232 SBSQ HOSP IP/OBS MODERATE 35: CPT | Mod: GC,,, | Performed by: PSYCHIATRY & NEUROLOGY

## 2017-04-12 PROCEDURE — 83735 ASSAY OF MAGNESIUM: CPT

## 2017-04-12 PROCEDURE — G8989 SELF CARE D/C STATUS: HCPCS | Mod: CI

## 2017-04-12 PROCEDURE — G8988 SELF CARE GOAL STATUS: HCPCS | Mod: CI

## 2017-04-12 PROCEDURE — 97535 SELF CARE MNGMENT TRAINING: CPT

## 2017-04-12 PROCEDURE — 99233 SBSQ HOSP IP/OBS HIGH 50: CPT | Mod: GC,,, | Performed by: PSYCHIATRY & NEUROLOGY

## 2017-04-12 PROCEDURE — 84100 ASSAY OF PHOSPHORUS: CPT

## 2017-04-12 PROCEDURE — 85025 COMPLETE CBC W/AUTO DIFF WBC: CPT

## 2017-04-12 RX ORDER — AMLODIPINE BESYLATE 10 MG/1
10 TABLET ORAL DAILY
Qty: 30 TABLET | Refills: 1 | Status: SHIPPED | OUTPATIENT
Start: 2017-04-12 | End: 2017-04-12 | Stop reason: HOSPADM

## 2017-04-12 RX ORDER — ASPIRIN 325 MG
325 TABLET ORAL DAILY
Refills: 0 | COMMUNITY
Start: 2017-04-12 | End: 2018-04-12

## 2017-04-12 RX ORDER — IBUPROFEN 200 MG
1 TABLET ORAL DAILY
Refills: 0 | COMMUNITY
Start: 2017-04-12

## 2017-04-12 RX ORDER — ATORVASTATIN CALCIUM 40 MG/1
40 TABLET, FILM COATED ORAL DAILY
Qty: 30 TABLET | Refills: 1 | Status: SHIPPED | OUTPATIENT
Start: 2017-04-12 | End: 2018-04-12

## 2017-04-12 RX ORDER — AMLODIPINE BESYLATE 10 MG/1
10 TABLET ORAL DAILY
Status: DISCONTINUED | OUTPATIENT
Start: 2017-04-12 | End: 2017-04-12 | Stop reason: HOSPADM

## 2017-04-12 RX ADMIN — NICOTINE 1 PATCH: 21 PATCH, EXTENDED RELEASE TRANSDERMAL at 08:04

## 2017-04-12 RX ADMIN — ACETAMINOPHEN 650 MG: 325 TABLET ORAL at 08:04

## 2017-04-12 RX ADMIN — AMLODIPINE BESYLATE 10 MG: 10 TABLET ORAL at 12:04

## 2017-04-12 RX ADMIN — HEPARIN SODIUM 5000 UNITS: 5000 INJECTION, SOLUTION INTRAVENOUS; SUBCUTANEOUS at 01:04

## 2017-04-12 RX ADMIN — CARVEDILOL 12.5 MG: 12.5 TABLET, FILM COATED ORAL at 05:04

## 2017-04-12 RX ADMIN — GABAPENTIN 600 MG: 300 CAPSULE ORAL at 01:04

## 2017-04-12 RX ADMIN — CLONIDINE HYDROCHLORIDE 0.1 MG: 0.1 TABLET ORAL at 08:04

## 2017-04-12 RX ADMIN — CARVEDILOL 12.5 MG: 12.5 TABLET, FILM COATED ORAL at 06:04

## 2017-04-12 RX ADMIN — OXYCODONE HYDROCHLORIDE 5 MG: 5 TABLET ORAL at 04:04

## 2017-04-12 RX ADMIN — POLYETHYLENE GLYCOL 3350 17 G: 17 POWDER, FOR SOLUTION ORAL at 08:04

## 2017-04-12 RX ADMIN — ATORVASTATIN CALCIUM 40 MG: 20 TABLET, FILM COATED ORAL at 08:04

## 2017-04-12 RX ADMIN — OXYCODONE HYDROCHLORIDE 5 MG: 5 TABLET ORAL at 11:04

## 2017-04-12 RX ADMIN — Medication 3 ML: at 05:04

## 2017-04-12 RX ADMIN — HEPARIN SODIUM 5000 UNITS: 5000 INJECTION, SOLUTION INTRAVENOUS; SUBCUTANEOUS at 05:04

## 2017-04-12 RX ADMIN — ASPIRIN 325 MG ORAL TABLET 325 MG: 325 PILL ORAL at 08:04

## 2017-04-12 RX ADMIN — STANDARDIZED SENNA CONCENTRATE AND DOCUSATE SODIUM 1 TABLET: 8.6; 5 TABLET, FILM COATED ORAL at 08:04

## 2017-04-12 NOTE — PLAN OF CARE
Transition of care note    Transfer to room 728    DX: CVA (cerebral vascular accident) [I63.9]  CVA (cerebral vascular accident) [I63.9]  Acute right MCA stroke [I63.511]     PT/OT: Discharge Facility/Level Of Care Needs: home health PT     Insurance: Payor: [employer] / Plan: [employer] MEDICARE HMO / Product Type: Medicare Advantage /      PCP: John Small MD     Pharmacy:   [facility] - [address]  [address]  [place] [address]  Phone: [phone] Fax: [phone]      No future appointments.     Mary Jenkins [facility]  [id number]

## 2017-04-12 NOTE — ASSESSMENT & PLAN NOTE
S/p tPA  24h MRI stable  MRA negative for aneurysm/AVM/stenosis/occlusion  Carotid US pending  TTE - no LAE

## 2017-04-12 NOTE — SUBJECTIVE & OBJECTIVE
NIH Stroke Scale:  Interval: 7 days or at discharge (whichever comes first)  Level of Consciousness: 0 - alert  LOC Questions: 0 - answers both correctly  LOC Commands: 0 - performs both correctly  Best Gaze: 0 - normal  Visual: 0 - no visual loss  Facial Palsy: 0 - normal  Motor Left Arm: 0 - no drift  Motor Right Arm: 0 - no drift  Motor Left Le - drift  Motor Right Le - no drift  Limb Ataxia: 0 - absent  Sensory: 1 - mild to moderate loss  Best Language: 0 - no aphasia  Dysarthria: 0 - normal articulation  Extinction and Inattention: 0 - no neglect  NIH Stroke Scale Total: 2    Modified [place] Scale:   Timeline: At discharge  Modified Tori Score: 1 - no significant disability

## 2017-04-12 NOTE — PLAN OF CARE
SW met with Pt and Pt granddaughter at bedside. Discussed HH list given and choices. He could not talk due to having test completed. Granddaughter did not know choices. When Pt can talk, they will contact SW phone to report choice and SW will set up HH. Pt does not need to wait on this to discharge.    Miguelina Funez LMSW  Neurocritical Care   [facility]  [id number]

## 2017-04-12 NOTE — PROGRESS NOTES
[facility]  Neurocritical Care  Progress Note    Admit Date: 4/10/2017  Length of Stay: 2    Subjective:     Chief Complaint: Embolic stroke involving right middle cerebral artery    History of Present Illness: Mr. Bustos is a 51 yr old gentleman who has been transferred to [facility] [place] after receiving TPA at an OSH for L eft sided plegia.     He as mowing is lawn when he first experienced weakness in the L side of body at 2 PM this afternoon.      Hospital Course:  tPA on 4/10    Interval History:  Symptoms improving.  Stable 24h scan.  Boarding for VN.    Review of Systems   Eyes: Negative for visual disturbance.   Respiratory: Negative for cough and shortness of breath.    Cardiovascular: Negative for chest pain and palpitations.   Gastrointestinal: Negative for nausea and vomiting.   Skin: Negative for rash and wound.   Neurological: Positive for speech difficulty and weakness.   Psychiatric/Behavioral: Negative for agitation and behavioral problems.       Objective:     Vitals:  Temp: 98 °F (36.7 °C) (04/12/17 1105)  Pulse: 78 (04/12/17 1400)  Resp: (!) 28 (04/12/17 1400)  BP: (!) 183/86 (04/12/17 1400)  SpO2: 100 % (04/12/17 1400)    Temp:  [97.6 °F (36.4 °C)-98.6 °F (37 °C)] 98 °F (36.7 °C)  Pulse:  [52-78] 78  Resp:  [18-38] 28  SpO2:  [91 %-100 %] 100 %  BP: (128-199)/(58-97) 183/86        04/11 0701 - 04/12 0700  In: 2480 [P.O.:2180; I.V.:300]  Out: 1840 [Urine:1840]    Physical Exam   Constitutional: He is oriented to person, place, and time. He appears well-developed and well-nourished. No distress.   HENT:   Head: Normocephalic and atraumatic.   Eyes: EOM are normal.   Cardiovascular: Normal rate.    Pulmonary/Chest: Effort normal.   Abdominal: Soft.   Neurological: He is alert and oriented to person, place, and time. GCS eye subscore is 4 - spontaneous. GCS verbal subscore is 5 - oriented. GCS motor subscore is 6 - obeys commands.   Skin: Skin is dry. No rash noted. He is not diaphoretic. No  erythema.   Psychiatric: He has a normal mood and affect. His behavior is normal. Judgment and thought content normal.   Nursing note and vitals reviewed.      NEUROLOGICAL EXAMINATION:     MENTAL STATUS   Oriented to person, place, and time.   Attention: normal. Concentration: normal.   Level of consciousness: alert    CRANIAL NERVES     CN II   Visual fields full to confrontation.     CN III, IV, VI   Extraocular motions are normal.     CN VIII   Hearing: intact    MOTOR EXAM   Muscle bulk: normal  Overall muscle tone: normal       L sided drift         [place] Coma Scale:  Best Eye Response: 4 - spontaneous  Best Motor Response: 6 - obeys commands  Best Verbal Response: 5 - oriented  Keith Coma Scale Total: 15        Medications:  Continuous Scheduled    amlodipine 10 mg Daily   aspirin 325 mg Daily   atorvastatin 40 mg Daily   carvedilol 12.5 mg BID WM   cloNIDine 0.1 mg BID   gabapentin 600 mg TID   heparin (porcine) 5,000 Units Q8H   nicotine 1 patch Daily   polyethylene glycol 17 g Daily   senna-docusate 8.6-50 mg 1 tablet BID   sodium chloride 0.9% 3 mL Q8H   PRN    acetaminophen 650 mg Q6H PRN   dextrose 50% 12.5 g PRN   glucagon (human recombinant) 1 mg PRN   flu vac [id number] 36mos up(PF) 0.5 mL vaccine x 1 dose   insulin aspart 1-10 Units Q6H PRN   ondansetron 4 mg Q12H PRN   oxycodone 5 mg Q6H PRN   pneumoc 13-torri conj-dip cr(PF) 0.5 mL vaccine x 1 dose     Today I personally reviewed pertinent medications, lines/drains/airways, imaging, cardiology, lab results,     Assessment/Plan:     Neuro  * Embolic stroke involving right middle cerebral artery  S/p tPA  24h MRI stable  MRA negative for aneurysm/AVM/stenosis/occlusion  Carotid US pending  TTE - no LAE    Cytotoxic cerebral edema  2/2 stroke  As seen on cerebral imaging    Cardiac  Essential hypertension  Stroke RF  Goal SBP<180 given tPA  Titrate antihypertensives as needed    Renal  ALONSO (acute kidney injury)  Resolved s/p IVF    Endocrine  Type 2  diabetes mellitus, without long-term current use of insulin  Stroke RF  Hold metformin  SSI   A1c 6.9  Monitor    Fluids/Electrolytes/Nutrition/GI  Hyperlipidemia  Stroke RF   on admission  Atorvastatin 40mg daily    Other  Tissue plasminogen activator (t-PA) administered at other facility within 24 hours prior to current admission  Given at 1900 on 4/10 for R MCA syndrome    Facial droop  2/2 stroke  Improving  SLP      Prophylaxis:  Venous Thromboembolism: NA  Stress Ulcer: NA  Ventilator Pneumonia: not applicable     Activity Orders          None        Full Code    Florida Nettles MD  Neurocritical Care  [facility]

## 2017-04-12 NOTE — PT/OT/SLP PROGRESS
"Occupational Therapy  Treatment    Sammy Bustos   MRN: [id number]   Admitting Diagnosis: Embolic stroke involving right middle cerebral artery    OT Date of Treatment: 04/12/17   OT Start Time: 0454  OT Stop Time: 0518  OT Total Time (min): 24 min    Billable Minutes:  Self Care/Home Management 24    General Precautions: Standard, fall, aspiration  Orthopedic Precautions: N/A  Braces: N/A    Do you have any cultural, spiritual, Hindu conflicts, given your current situation?: Spiritism    Subjective:  Communicated with nurse prior to session.  Patient:  "Good morning."  Nurse reported:  "He has orders to step down; we're just waiting for a bed."  Pain Rating: 10/10  Location:  Back and legs  Addressed by:  repositioning   Pain Rating Post-Intervention:7/10    Objective:  Patient found with: blood pressure cuff, SCD, telemetry  Family not present.    Functional Mobility:  Bed Mobility:  Rolling/Turning to Left: Modified independent  Rolling/Turning Right: Modified independent  Scooting/Bridging: Modified Independent  Supine to Sit: Modified Independent  Sit to Supine: Modified Independent    Transfers:   Sit <> Stand Assistance: Modified Independent  Sit <> Stand Assistive Device: No Assistive Device  Bed <> Chair Technique: Stand Pivot  Bed <> Chair Transfer Assistance: Modified Independent    Activities of Daily Living:  Feeding Level of Assistance: Independent  UE Dressing Level of Assistance: Modified independent  LE Dressing Level of Assistance: Modified independent  Grooming Position: Standing  Grooming Level of Assistance: Modified independent  Toileting Where Assessed: Toilet  Toileting Level of Assistance: Modified independent     Additional Treatment:   Patient education provided for stroke warning signs, prevention guidelines and personal risk factors. Patient verbalizing understanding via teach back method. Patient education provided on role of OT and need for HH upon discharge. Patient verbalizing " understanding via teach back method. Continued education, patient/ family training recommended. Patient's functional status and disposition recommendation discussed with patient and nurse. Patient alert and oriented x 3; able to follow 4/4 one step commands. Patient attentive and interactive throughout the session. Successfully performed 5/5 problem solving scenarios.  White board updated in patient's room. OT asked if there were any other questions; patient/ family had no further questions.        AM-PAC 6 CLICK ADL   How much help from another person does this patient currently need?   1 = Unable, Total/Dependent Assistance  2 = A lot, Maximum/Moderate Assistance  3 = A little, Minimum/Contact Guard/Supervision  4 = None, Modified [place]/Independent    Putting on and taking off regular lower body clothing? : 4  Bathing (including washing, rinsing, drying)?: 4  Toileting, which includes using toilet, bedpan, or urinal? : 4  Putting on and taking off regular upper body clothing?: 4  Taking care of personal grooming such as brushing teeth?: 4  Eating meals?: 4  Total Score: 24     AM-PAC Raw Score CMS G-Code Modifier Level of Impairment Assistance   6 % Total / Unable   7 - 9 CM 80 - 100% Maximal Assist   10 - 14 CL 60 - 80% Moderate Assist   15 - 19 CK 40 - 60% Moderate Assist   20 - 22 CJ 20 - 40% Minimal Assist   23 CI 1-20% SBA / CGA   24 CH 0% Independent/ Mod I     Patient left supine with all lines intact and call button in reach    Assessment:  Sammy Bustos is a 51 y.o. male with a medical diagnosis of Embolic stroke involving right middle cerebral artery and presents with performance deficits of physical skills including impaired balance, mobility, strength, dexterity, fine motor coordination, gross motor coordination, pain and endurance.  These performance deficits have resulted in activity limitations including but not limited to: transfers, ascending/ descending stairs, walking short and long  distances, walking around obstacles, transitional movement patterns (kneeling, bending); upper body dressing, lower body dressing, brushing teeth, toileting, bathing, carrying objects, shopping, and meal preparation. Patient's role as independent caretaker for self has been affected. Patient will benefit from skilled OT services to maximize level of independence with self-care skills and functional mobility. Will benefit from HH.    Rehab identified problem list/impairments: Rehab identified problem list/impairments: weakness, impaired sensation, impaired endurance, impaired balance, gait instability, decreased upper extremity function, impaired self care skills, impaired functional mobilty, decreased coordination    Rehab potential is good.    Activity tolerance: Good    Discharge recommendations: Discharge Facility/Level Of Care Needs: home     Barriers to discharge: Barriers to Discharge: None    Equipment recommendations: none     GOALS:   Occupational Therapy Goals        Problem: Occupational Therapy Goal    Goal Priority Disciplines Outcome Interventions   Occupational Therapy Goal     OT, PT/OT     Description:  Goals set 4/11 to be addressed for 7 days with expiration date, 4/18:  Patient will increase functional independence with ADLs by performing:    Patient will demonstrate supine -sit with modified independent.   Not met  Patient will demonstrate stand pivot transfers with modified independent.   Not met  Patient will demonstrate grooming while standing with modified independent.   Not met  Patient will demonstrate upper body dressing with modified independent.   Not met  Patient will demonstrate lower body dressing with modified independent.   Not met  Patient will demonstrate toileting with modified independent.   Not met  Patient will demonstrate bathing while seated EOB with modified independent.   Not met  Patient's family / caregiver will demonstrate independence and safety with assisting patient  with self-care skills and functional mobility.     Not met  Patient and/or patient's family will verbalize understanding of stroke prevention guidelines, personal risk factors and stroke warning signs via teachback method.  Not met                     Plan:  Patient to be seen 6 x/week to address the above listed problems via self-care/home management, sensory integration, therapeutic exercises, therapeutic activities, neuromuscular re-education  Plan of Care expires: 05/09/17  Plan of Care reviewed with: patient         ATUL Raymundo  04/12/2017

## 2017-04-12 NOTE — NURSING TRANSFER
Nursing Transfer Note      4/12/2017     Transfer To: [address]     Transfer via wheelchair    Transfer with cardiac monitoring    Transported by Veronica Rollins RN    Medicines sent: none     Chart send with patient: Yes    Notified: daughter    Patient reassessed at: 1445    Upon arrival to floor: cardiac monitor applied, patient oriented to room and call bell in reach

## 2017-04-12 NOTE — SUBJECTIVE & OBJECTIVE
Interval History:  Symptoms improving.  Stable 24h scan.    Review of Systems   Eyes: Negative for visual disturbance.   Respiratory: Negative for cough and shortness of breath.    Cardiovascular: Negative for chest pain and palpitations.   Gastrointestinal: Negative for nausea and vomiting.   Skin: Negative for rash and wound.   Neurological: Positive for speech difficulty and weakness.   Psychiatric/Behavioral: Negative for agitation and behavioral problems.       Objective:     Vitals:  Temp: 98 °F (36.7 °C) (04/12/17 1105)  Pulse: 78 (04/12/17 1400)  Resp: (!) 28 (04/12/17 1400)  BP: (!) 183/86 (04/12/17 1400)  SpO2: 100 % (04/12/17 1400)    Temp:  [97.6 °F (36.4 °C)-98.6 °F (37 °C)] 98 °F (36.7 °C)  Pulse:  [52-78] 78  Resp:  [18-38] 28  SpO2:  [91 %-100 %] 100 %  BP: (128-199)/(58-97) 183/86        04/11 0701 - 04/12 0700  In: 2480 [P.O.:2180; I.V.:300]  Out: 1840 [Urine:1840]    Physical Exam   Constitutional: He is oriented to person, place, and time. He appears well-developed and well-nourished. No distress.   HENT:   Head: Normocephalic and atraumatic.   Eyes: EOM are normal.   Cardiovascular: Normal rate.    Pulmonary/Chest: Effort normal.   Abdominal: Soft.   Neurological: He is alert and oriented to person, place, and time. GCS eye subscore is 4 - spontaneous. GCS verbal subscore is 5 - oriented. GCS motor subscore is 6 - obeys commands.   Skin: Skin is dry. No rash noted. He is not diaphoretic. No erythema.   Psychiatric: He has a normal mood and affect. His behavior is normal. Judgment and thought content normal.   Nursing note and vitals reviewed.      NEUROLOGICAL EXAMINATION:     MENTAL STATUS   Oriented to person, place, and time.   Attention: normal. Concentration: normal.   Level of consciousness: alert    CRANIAL NERVES     CN II   Visual fields full to confrontation.     CN III, IV, VI   Extraocular motions are normal.     CN VIII   Hearing: intact    MOTOR EXAM   Muscle bulk: normal  Overall  muscle tone: normal       L sided drift         [place] Coma Scale:  Best Eye Response: 4 - spontaneous  Best Motor Response: 6 - obeys commands  Best Verbal Response: 5 - oriented  Keith Coma Scale Total: 15        Medications:  Continuous Scheduled    amlodipine 10 mg Daily   aspirin 325 mg Daily   atorvastatin 40 mg Daily   carvedilol 12.5 mg BID WM   cloNIDine 0.1 mg BID   gabapentin 600 mg TID   heparin (porcine) 5,000 Units Q8H   nicotine 1 patch Daily   polyethylene glycol 17 g Daily   senna-docusate 8.6-50 mg 1 tablet BID   sodium chloride 0.9% 3 mL Q8H   PRN    acetaminophen 650 mg Q6H PRN   dextrose 50% 12.5 g PRN   glucagon (human recombinant) 1 mg PRN   flu vac [id number] 36mos up(PF) 0.5 mL vaccine x 1 dose   insulin aspart 1-10 Units Q6H PRN   ondansetron 4 mg Q12H PRN   oxycodone 5 mg Q6H PRN   pneumoc 13-torri conj-dip cr(PF) 0.5 mL vaccine x 1 dose     Today I personally reviewed pertinent medications, lines/drains/airways, imaging, cardiology, lab results,

## 2017-04-12 NOTE — PLAN OF CARE
Problem: Occupational Therapy Goal  Goal: Occupational Therapy Goal  Goals set 4/11 to be addressed for 7 days with expiration date, 4/18:  Patient will increase functional independence with ADLs by performing:    Patient will demonstrate supine -sit with modified independent. Not met  Patient will demonstrate stand pivot transfers with modified independent. Not met  Patient will demonstrate grooming while standing with modified independent. Not met  Patient will demonstrate upper body dressing with modified independent. Not met  Patient will demonstrate lower body dressing with modified independent. Not met  Patient will demonstrate toileting with modified independent. Not met  Patient will demonstrate bathing while seated EOB with modified independent. Not met  Patients family / caregiver will demonstrate independence and safety with assisting patient with self-care skills and functional mobility. Not met  Patient and/or patients family will verbalize understanding of stroke prevention guidelines, personal risk factors and stroke warning signs via teachback method. Not met        Goals remain appropriate.  ATUL Barriga  4/12/2017

## 2017-04-12 NOTE — DISCHARGE SUMMARY
[facility]  Vascular Neurology  Comprehensive Stroke Center  Discharge Summary     Summary:     Admit Date: 4/10/2017  7:51 PM    Discharge Date and Time:  2017 5:08 PM    Attending Physician: Jose Samano MD     Discharge Provider: Maurice Plummer MD    History of Present Illness: Pt is a 51 yr old male with PMHx of HTN, DMII, HLD, CAD (s/p PCI), PAD (s/p stents) who presented as a transfer from OSH with concerns of R MCA syndrome, s/p tpa. Patient reports around 2pm this afternoon having symptoms of acute onset left arm weakness, followed by left leg weakness. Patient transferred to hospital in [place], tpa administered and transferred here for possible thrombectomy. Per chart review; NIH at OSH prior to tpa reported to 24; however most of symptoms had resolved on admit here.     Hospital Course (synopsis of major diagnoses, care, treatment, and services provided during the course of the hospital stay): : MRI showing small right MCA CVA. MRA unremarkable.   : Doing well, symptoms have improved, plan to be discharge today home with home health for PT and OT.       NIH Stroke Scale:  Interval: 7 days or at discharge (whichever comes first)  Level of Consciousness: 0 - alert  LOC Questions: 0 - answers both correctly  LOC Commands: 0 - performs both correctly  Best Gaze: 0 - normal  Visual: 0 - no visual loss  Facial Palsy: 0 - normal  Motor Left Arm: 0 - no drift  Motor Right Arm: 0 - no drift  Motor Left Le - drift  Motor Right Le - no drift  Limb Ataxia: 0 - absent  Sensory: 1 - mild to moderate loss  Best Language: 0 - no aphasia  Dysarthria: 0 - normal articulation  Extinction and Inattention: 0 - no neglect  NIH Stroke Scale Total: 2    Modified Tori Scale:   Timeline: At discharge  Modified [place] Score: 1 - no significant disability           Assessment/Plan:     Interventions: IV t-PA    Complications: None    Research Candidate?:   No    Neurological deficit at discharge: Weakness: left, Sensory Loss: left     [place] [facility] [place] Diagnoses:    Diagnosis Date Noted POA    PRINCIPAL PROBLEM:  Embolic stroke involving right middle cerebral artery [I63.411] 04/10/2017 Yes    Bruit [R09.89] 04/12/2017 Yes    CVA (cerebral vascular accident) [I63.9] 04/12/2017 Yes    Acute right MCA stroke [I63.511] 04/11/2017 Yes    Tissue plasminogen activator (t-PA) administered at other facility within 24 hours prior to current admission [Z92.82] 04/10/2017 Not Applicable    Cytotoxic cerebral edema [G93.6] 04/10/2017 Yes    Hypovolemia [E86.1] 04/10/2017 Yes    Spastic hemiplegia affecting left nondominant side [G81.14] 04/10/2017 Yes    Facial droop [R29.810] 04/10/2017 Yes    ALONSO (acute kidney injury) [N17.9] 04/10/2017 Yes    Type 2 diabetes mellitus, without long-term current use of insulin [E11.9] 10/28/2013 Yes    Essential hypertension [I10] 10/28/2013 Yes    Hyperlipidemia [E78.5] 10/28/2013 Yes     Chronic      Problems Resolved During this Admission:    Diagnosis Date Noted Date Resolved POA     No new Assessment & Plan notes have been filed under this hospital service since the last note was generated.  Service: Vascular Neurology      Recommendations:     Post-discharge complication risks: None    Stroke Education given to: patient    Follow-up in Stroke Clinic in 7 days    Discharge Plan:  Antithrombotics: Aspirin 325 mg  Statin: Atorvastatin 40 mg    Follow Up:  Follow-up Information     Follow up with John Small MD In 1 week.    Specialty:  Internal Medicine    Contact information:    [address] [facility] [address] [phone]          Follow up with [facility] VASCULAR NEUROLOGY In 4 weeks.    Specialty:  Vascular Neurology    Why:  stroke follow up appointment    Contact information:    [address]  [facility] [address] [phone]         Patient Instructions:     Diet general   Order Specific Question Answer Comments   Additional restrictions: Diabetic 1800      Activity as tolerated       Medications:  Reconciled Home Medications:   Current Discharge Medication List      START taking these medications    Details   aspirin 325 MG tablet Take 1 tablet (325 mg total) by mouth once daily.  Refills: 0      nicotine (NICODERM CQ) 21 mg/24 hr Place 1 patch onto the skin once daily.  Refills: 0         CONTINUE these medications which have CHANGED    Details   atorvastatin (LIPITOR) 40 MG tablet Take 1 tablet (40 mg total) by mouth once daily.  Qty: 30 tablet, Refills: 1         CONTINUE these medications which have NOT CHANGED    Details   baclofen (LIORESAL) 10 MG tablet Take 10 mg by mouth 2 (two) times daily.      benazepril (LOTENSIN) 20 MG tablet Take 20 mg by mouth once daily.      carisoprodol (SOMA) 350 MG tablet Take 350 mg by mouth 4 (four) times daily as needed.      carvedilol (COREG) 12.5 MG tablet Take 12.5 mg by mouth 2 (two) times daily with meals.      clonidine (CATAPRES) 0.2 MG tablet Take 0.2 mg by mouth 2 (two) times daily.      gabapentin (NEURONTIN) 600 MG tablet Take 600 mg by mouth 3 (three) times daily.      metformin (GLUCOPHAGE) 500 MG tablet Take 500 mg by mouth 2 (two) times daily with meals.      nitroGLYCERIN 0.4 MG/DOSE TL SPRY (NITROLINGUAL) 0.4 mg/dose spray Place 1 spray under the tongue every 5 (five) minutes as needed.      olmesartan-amlodipine-hctz (TRIBENZOR) 40-10-25 mg Tab Take by mouth.      oxycodone (OXY-IR) 5 mg Cap Take 10 mg by mouth every 4 (four) hours as needed.      oxycodone (ROXICODONE) 30 MG Tab Take 5 mg by mouth every 6 (six) hours as needed.             DARIO Grant MD  Neurology PGY2  Comprehensive Stroke Center  Department of Vascular Neurology   [facility]

## 2017-04-12 NOTE — PLAN OF CARE
Problem: SLP Goal  Goal: SLP Goal  Goals to be met 4/18  1 Pt will tolerate soft diet and thin liquids  2 Pt will participate in further eval of reading/writing and visual spatial skills to determine goals-met  3 Pt will participate in further higher level cognitive eval including math/time to determine goals -met  No further skilled ST recommended upon discharge.  Pt's speech/language/cognitive and swallowing skills appear wfl and are reported by pt to be at baseline.     JOSELIN Samuel, CCC-SLP  Speech Language Pathologist  [phone]  4/12/2017

## 2017-04-12 NOTE — SUBJECTIVE & OBJECTIVE
Neurologic Chief Complaint: right MCA stroke    Subjective:     Interval History: Patient is seen for follow-up neurological assessment and treatment recommendations: Doing well, symptoms improved.     HPI, Past Medical, Family, and Social History remains the same as documented in the initial encounter.     Review of Systems   Constitutional: Negative for appetite change and fatigue.   HENT: Negative for ear pain and facial swelling.    Eyes: Negative for pain and visual disturbance.   Respiratory: Negative for cough and shortness of breath.    Cardiovascular: Negative for chest pain and palpitations.   Gastrointestinal: Negative for abdominal distention and abdominal pain.   Endocrine: Negative for polydipsia and polyuria.   Genitourinary: Negative for flank pain and hematuria.   Musculoskeletal: Positive for back pain. Negative for neck pain.   Skin: Negative for color change and rash.   Neurological: Positive for weakness. Negative for dizziness and headaches.   Psychiatric/Behavioral: Negative for agitation. The patient is not nervous/anxious.      Scheduled Meds:   amlodipine  10 mg Oral Daily    aspirin  325 mg Oral Daily    atorvastatin  40 mg Oral Daily    carvedilol  12.5 mg Oral BID WM    cloNIDine  0.1 mg Oral BID    gabapentin  600 mg Oral TID    heparin (porcine)  5,000 Units Subcutaneous Q8H    nicotine  1 patch Transdermal Daily    polyethylene glycol  17 g Oral Daily    senna-docusate 8.6-50 mg  1 tablet Oral BID    sodium chloride 0.9%  3 mL Intravenous Q8H     Continuous Infusions:   PRN Meds:acetaminophen, dextrose 50%, glucagon (human recombinant), flu vac [id number] 36mos up(PF), insulin aspart, ondansetron, oxycodone, pneumoc 13-torri conj-dip cr(PF)    Objective:     Vital Signs (Most Recent):  Temp: 98 °F (36.7 °C) (04/12/17 1105)  Pulse: (!) 58 (04/12/17 1200)  Resp: (!) 21 (04/12/17 1200)  BP: (!) 174/84 (04/12/17 1200)  SpO2: 100 % (04/12/17 1200)  BP Location: Right arm    Vital  Signs Range (Last 24H):  Temp:  [97.6 °F (36.4 °C)-98.6 °F (37 °C)]   Pulse:  [52-74]   Resp:  [16-38]   BP: (124-199)/(58-97)   SpO2:  [91 %-100 %]   BP Location: Right arm    Physical Exam   Constitutional: He is oriented to person, place, and time. He appears well-developed and well-nourished.   HENT:   Head: Normocephalic and atraumatic.   Nose: Nose normal.   Eyes: Conjunctivae and EOM are normal. Pupils are equal, round, and reactive to light.   Neck: Normal range of motion. Neck supple.   Cardiovascular: Normal rate, regular rhythm and normal heart sounds.    Pulmonary/Chest: Effort normal and breath sounds normal.   Abdominal: Soft. Bowel sounds are normal. There is no guarding.   Musculoskeletal: Normal range of motion.   Neurological: He is alert and oriented to person, place, and time.   Skin: Skin is warm and dry.   Psychiatric: He has a normal mood and affect. His behavior is normal. Judgment and thought content normal.   Nursing note and vitals reviewed.      Neurological Exam:   LOC: alert and follows requests  Language: No aphasia  Speech: Dysarthria  Orientation: Person, Place, Time  EOM (CN III, IV, VI): Full/intact  Pupils (CN III, IV, VI): PERRL  Facial Sensation (CN V): Symmetric  Facial Movement (CN VII): lower weakness left lower  Tongue (CN XII): to midline  Motor*: Arm Left: Paretic: 4/5, Leg Left: Paretic: 4/5, Arm Right: Normal (5/5), Leg Right: Normal (5/5)  Cerebellar*: Normal limb  Sensation: michell-hypoesthesia left  Tone: Arm-Left: normal; Leg-Left: normal; Arm-Right: normal; Leg-Right: normal       NIH Stroke Scale:    Level of Consciousness: 0 - alert  LOC Questions: 0 - answers both correctly  LOC Commands: 0 - performs both correctly  Best Gaze: 0 - normal  Visual: 0 - no visual loss  Facial Palsy: 0 - normal  Motor Left Arm: 0 - no drift  Motor Right Arm: 0 - no drift  Motor Left Le - drift  Motor Right Le - no drift  Limb Ataxia: 0 - absent  Sensory: 1 - mild to moderate  loss  Best Language: 0 - no aphasia  Dysarthria: 0 - normal articulation  Extinction and Inattention: 0 - no neglect  NIH Stroke Scale Total: 2      Laboratory:  CMP:   Recent Labs  Lab 04/12/17  0511   CALCIUM 8.9   ALBUMIN 3.3*   PROT 6.8      K 4.0   CO2 26      BUN 16   CREATININE 0.9   ALKPHOS 79   ALT 19   AST 30   BILITOT 0.6     CBC:   Recent Labs  Lab 04/12/17  0511   WBC 5.14   RBC 5.24   HGB 14.1   HCT 42.5      MCV 81*   MCH 26.9*   MCHC 33.2     Lipid Panel:   Recent Labs  Lab 04/10/17  2155   CHOL 180   LDLCALC 127.0   HDL 38*   TRIG 75     Hgb A1C:   Recent Labs  Lab 04/10/17  2155   HGBA1C 6.9*       Diagnostic Results:  I have personally reviewed: MRI brain and MRA head  Findings: right MCA small size. MRA of the brain unremarkable.

## 2017-04-12 NOTE — PROGRESS NOTES
[facility]  Vascular Neurology  Comprehensive Stroke Center  Progress Note      Neurologic Chief Complaint: right MCA stroke    Subjective:     Interval History: Patient is seen for follow-up neurological assessment and treatment recommendations: Doing well, symptoms improved.     HPI, Past Medical, Family, and Social History remains the same as documented in the initial encounter.     Review of Systems   Constitutional: Negative for appetite change and fatigue.   HENT: Negative for ear pain and facial swelling.    Eyes: Negative for pain and visual disturbance.   Respiratory: Negative for cough and shortness of breath.    Cardiovascular: Negative for chest pain and palpitations.   Gastrointestinal: Negative for abdominal distention and abdominal pain.   Endocrine: Negative for polydipsia and polyuria.   Genitourinary: Negative for flank pain and hematuria.   Musculoskeletal: Positive for back pain. Negative for neck pain.   Skin: Negative for color change and rash.   Neurological: Positive for weakness. Negative for dizziness and headaches.   Psychiatric/Behavioral: Negative for agitation. The patient is not nervous/anxious.      Scheduled Meds:   amlodipine  10 mg Oral Daily    aspirin  325 mg Oral Daily    atorvastatin  40 mg Oral Daily    carvedilol  12.5 mg Oral BID WM    cloNIDine  0.1 mg Oral BID    gabapentin  600 mg Oral TID    heparin (porcine)  5,000 Units Subcutaneous Q8H    nicotine  1 patch Transdermal Daily    polyethylene glycol  17 g Oral Daily    senna-docusate 8.6-50 mg  1 tablet Oral BID    sodium chloride 0.9%  3 mL Intravenous Q8H     Continuous Infusions:   PRN Meds:acetaminophen, dextrose 50%, glucagon (human recombinant), flu vac [id number] 36mos up(PF), insulin aspart, ondansetron, oxycodone, pneumoc 13-torri conj-dip cr(PF)    Objective:     Vital Signs (Most Recent):  Temp: 98 °F (36.7 °C) (04/12/17 1105)  Pulse: (!) 58 (04/12/17 1200)  Resp: (!) 21 (04/12/17  1200)  BP: (!) 174/84 (04/12/17 1200)  SpO2: 100 % (04/12/17 1200)  BP Location: Right arm    Vital Signs Range (Last 24H):  Temp:  [97.6 °F (36.4 °C)-98.6 °F (37 °C)]   Pulse:  [52-74]   Resp:  [16-38]   BP: (124-199)/(58-97)   SpO2:  [91 %-100 %]   BP Location: Right arm    Physical Exam   Constitutional: He is oriented to person, place, and time. He appears well-developed and well-nourished.   HENT:   Head: Normocephalic and atraumatic.   Nose: Nose normal.   Eyes: Conjunctivae and EOM are normal. Pupils are equal, round, and reactive to light.   Neck: Normal range of motion. Neck supple.   Cardiovascular: Normal rate, regular rhythm and normal heart sounds.    Pulmonary/Chest: Effort normal and breath sounds normal.   Abdominal: Soft. Bowel sounds are normal. There is no guarding.   Musculoskeletal: Normal range of motion.   Neurological: He is alert and oriented to person, place, and time.   Skin: Skin is warm and dry.   Psychiatric: He has a normal mood and affect. His behavior is normal. Judgment and thought content normal.   Nursing note and vitals reviewed.      Neurological Exam:   LOC: alert and follows requests  Language: No aphasia  Speech: Dysarthria  Orientation: Person, Place, Time  EOM (CN III, IV, VI): Full/intact  Pupils (CN III, IV, VI): PERRL  Facial Sensation (CN V): Symmetric  Facial Movement (CN VII): lower weakness left lower  Tongue (CN XII): to midline  Motor*: Arm Left: Paretic: 4/5, Leg Left: Paretic: 4/5, Arm Right: Normal (5/5), Leg Right: Normal (5/5)  Cerebellar*: Normal limb  Sensation: michell-hypoesthesia left  Tone: Arm-Left: normal; Leg-Left: normal; Arm-Right: normal; Leg-Right: normal       NIH Stroke Scale:    Level of Consciousness: 0 - alert  LOC Questions: 0 - answers both correctly  LOC Commands: 0 - performs both correctly  Best Gaze: 0 - normal  Visual: 0 - no visual loss  Facial Palsy: 0 - normal  Motor Left Arm: 0 - no drift  Motor Right Arm: 0 - no drift  Motor Left  Le - drift  Motor Right Le - no drift  Limb Ataxia: 0 - absent  Sensory: 1 - mild to moderate loss  Best Language: 0 - no aphasia  Dysarthria: 0 - normal articulation  Extinction and Inattention: 0 - no neglect  NIH Stroke Scale Total: 2      Laboratory:  CMP:   Recent Labs  Lab 17  0511   CALCIUM 8.9   ALBUMIN 3.3*   PROT 6.8      K 4.0   CO2 26      BUN 16   CREATININE 0.9   ALKPHOS 79   ALT 19   AST 30   BILITOT 0.6     CBC:   Recent Labs  Lab 17  0511   WBC 5.14   RBC 5.24   HGB 14.1   HCT 42.5      MCV 81*   MCH 26.9*   MCHC 33.2     Lipid Panel:   Recent Labs  Lab 04/10/17  2155   CHOL 180   LDLCALC 127.0   HDL 38*   TRIG 75     Hgb A1C:   Recent Labs  Lab 04/10/17  2155   HGBA1C 6.9*       Diagnostic Results:  I have personally reviewed: MRI brain and MRA head  Findings: right MCA small size. MRA of the brain unremarkable.     Assessment/Plan:     : Doing well, symptoms have improved, plan to be discharge today.     * Embolic stroke involving right middle cerebral artery  Per history, reported to have had cortical signs on exam at OSH; dense R MCA sign; s/p tpa; much improved on admit - no cortical signs noted.   Antithrombotics for secondary stroke prevention: Aspirin.  Statins for secondary stroke prevention and hyperlipidemia: Atorvastatin- 40 mg oral daily,   Aggressive risk factor modification: Hypertension, Diabetes and High Cholesterol,   Rehab Efforts: Physical Therapy, Occupational Therapy, will recommend to be discharge home with HH.   MRI showing right small MCA ischemic stroke. MRA essentially normal. Trans-thoracic cardiac echo with EF 65 no diastolic dysfuncion, moderate valvular stenosis, VTE Prophylaxis: Heparin: Place SCDs, BP Parameters: SBP <180   Stroke Education given    Hyperlipidemia  - Atorvastatin 40mg qd    Essential hypertension  - stroke risk factor  - SBP <180  - Need to follow up with PCP once discharge for good control     Type 2  diabetes mellitus, without long-term current use of insulin  - stroke risk factor  - Counseled on diet and compliance with medications.     Tissue plasminogen activator (t-PA) administered at other facility within 24 hours prior to current admission  - SBP goal <180  - Can start ASA after 24 hrs of TPA.     Cytotoxic cerebral edema  Noted on imaging      Maurice Plummer MD  Comprehensive Stroke Center  Department of Vascular Neurology   [facility]

## 2017-04-12 NOTE — PT/OT/SLP DISCHARGE
Occupational Therapy Discharge Summary    Sammy Bustos  MRN: [id number]   Embolic stroke involving right middle cerebral artery   Patient Discharged from acute Occupational Therapy on 4/12.  Please refer to prior OT note dated on 4/12 for functional status.     Assessment:   Patient appropriate for care in another setting.  GOALS:   Occupational Therapy Goals     Not on file      Multidisciplinary Problems (Resolved)        Problem: Occupational Therapy Goal    Goal Priority Disciplines Outcome Interventions   Occupational Therapy Goal   (Resolved)     OT, PT/OT Outcome(s) achieved    Description:  Goals set 4/11 to be addressed for 7 days with expiration date, 4/18:  Patient will increase functional independence with ADLs by performing:    Patient will demonstrate supine -sit with modified independent.   Not met  Patient will demonstrate stand pivot transfers with modified independent.   Not met  Patient will demonstrate grooming while standing with modified independent.   Not met  Patient will demonstrate upper body dressing with modified independent.   Not met  Patient will demonstrate lower body dressing with modified independent.   Not met  Patient will demonstrate toileting with modified independent.   Not met  Patient will demonstrate bathing while seated EOB with modified independent.   Not met  Patient's family / caregiver will demonstrate independence and safety with assisting patient with self-care skills and functional mobility.     Not met  Patient and/or patient's family will verbalize understanding of stroke prevention guidelines, personal risk factors and stroke warning signs via teachback method.  Not met                   Reasons for Discontinuation of Therapy Services  Transfer to alternate level of care.      Plan:  Patient Discharged to: Outpatient Therapy Services   ATUL Barriga  4/12/2017  .

## 2017-04-12 NOTE — ASSESSMENT & PLAN NOTE
Per history, reported to have had cortical signs on exam at OSH; dense R MCA sign; s/p tpa; much improved on admit - no cortical signs noted.   Antithrombotics for secondary stroke prevention: Aspirin.  Statins for secondary stroke prevention and hyperlipidemia: Atorvastatin- 40 mg oral daily,   Aggressive risk factor modification: Hypertension, Diabetes and High Cholesterol,   Rehab Efforts: Physical Therapy, Occupational Therapy, will recommend to be discharge home with HH.   MRI showing right small MCA ischemic stroke. MRA essentially normal. Trans-thoracic cardiac echo with EF 65 no diastolic dysfuncion, moderate valvular stenosis, VTE Prophylaxis: Heparin: Place SCDs, BP Parameters: SBP <180   Stroke Education given

## 2017-04-12 NOTE — PT/OT/SLP PROGRESS
"Speech Language Pathology  Treatment/Discharge    Sammy Bustos   MRN: [id number]   Admitting Diagnosis: Embolic stroke involving right middle cerebral artery    Diet recommendations: Solid Diet Level: Dental Soft  Liquid Diet Level: Thin   Aspiration Precautions: Small bites/sips and Alternating bites/sips    SLP Treatment Date: 04/12/17  Speech Start Time: 1048     Speech Stop Time: 1120     Speech Total (min): 32 min       TREATMENT BILLABLE MINUTES:  Speech Therapy Individual 32    Has the patient been evaluated by SLP for swallowing? 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  Pain Rating Post-Intervention: 10/10    Objective:    Pt seen this am for completion of speech/language/cognitive assessment.  Pt without glasses at hospital; however, was able to demonstrated adequate reading/writing despite absence of glasses.  Mr. Bustos eloisa an accurate representation of the clock face with no overt evidence of visual-spatial deficits.  Pt denied any significant changes with vision s/p CVA. Following a filled delay, pt recalled 3/3 words and their context clues ind'ly.  He also recalled daily events, including last administration of pain meds and when it will be due again.  Math/time word problems were responded to with 100% accuracy.     Education provided regarding ability to contact MD provider upon discharge if any increased cognitive difficulty is evident with return to previous activities of daily living.  At this time, speech/language/cognitive skills felt to be wfl and pt reporting at baseline level of function.      FIM:  Social Interaction: 7 Complete [place]--The patient interacts appropriately with staff, other patients, and " family members (e.g., controls temper, accepts criticism, is aware that words and actions have an impact on others), and does not require medication for   Problem Solvin Complete [place]--The patient consistently recognizes problems when present, makes appropriate decisions, initiates and carries out a sequence of steps to solve complex problems until the task is completed, and self-corrects if errors are made.   Comprehension: 7 Complete [place]--The patient understand complex or abstract directions and conversation, and understand either spoken or written language (not necessarily English).   Expression: 7 Complete [place]--The patient expresses complex or abstract ideas clearly and fluently (not necessarily in English).   Memory: 7 Complete [place]--The patient recognizes people frequently encountered, remebers daily routines, and executes requests of others without need for repetition.    Assessment:  Sammy Bustos is a 51 y.o. male with a medical diagnosis of Embolic stroke involving right middle cerebral artery and presents with functional speech/language/cognitive/swallow skills s/p stroke.     Discharge recommendations: Discharge Facility/Level Of Care Needs: home     Goals:   SLP Goals        Problem: SLP Goal    Goal Priority Disciplines Outcome   SLP Goal     SLP    Description:   Goals to be met   1 Pt will tolerate soft diet and thin liquids  2 Pt will participate in further eval of reading/writing and visual spatial skills to determine goals-met  3 Pt will participate in further higher level cognitive eval including math/time to determine goals -met               Plan:   Patient to be seen Therapy Frequency: 5 x/week   Plan of Care expires:    Plan of Care reviewed with: patient  SLP Follow-up?: No     No further skilled ST recommended at this time as pt's speech/language/cognitive skills felt to be wfl and pt reporting at baseline level of function.  MD indicated pt may  possibly discharge today.  Please re-consult ST at any time if warranted.     JOSELIN Samuel, CCC-SLP  Speech Language Pathologist  [phone]  4/12/2017

## 2017-04-12 NOTE — NURSING
Pt discharged to home. POC and AVS reviewed with PT. IV was removed, tip intact. Pt was AA&Ox4, MAEW and in no acute distress. Telemetry was removed and returned.

## 2017-04-12 NOTE — PLAN OF CARE
[facility]    HOME HEALTH ORDERS  FACE TO FACE ENCOUNTER    Patient Name: Sammy Bustos  YOB: 1965    PCP: John Small MD   [facility] [address] [facility]  PCP Phone Number: [phone]  PCP Fax: [phone]    Encounter Date: 04/12/2017    Admit to Home Health    Diagnoses:  Active Hospital Problems    Diagnosis  POA    *Embolic stroke involving right middle cerebral artery [I63.411]  Yes     Priority: 1 - High    Acute right MCA stroke [I63.511]  Yes    Tissue plasminogen activator (t-PA) administered at other facility within 24 hours prior to current admission [Z92.82]  Not Applicable    Cytotoxic cerebral edema [G93.6]  Yes    Hypovolemia [E86.1]  Yes    Spastic hemiplegia affecting left nondominant side [G81.14]  Yes    Facial droop [R29.810]  Yes    ALONSO (acute kidney injury) [N17.9]  Yes    Type 2 diabetes mellitus, without long-term current use of insulin [E11.9]  Yes    Essential hypertension [I10]  Yes    Hyperlipidemia [E78.5]  Yes     Chronic      Resolved Hospital Problems    Diagnosis Date Resolved POA   No resolved problems to display.       No future appointments.        I have seen and examined this patient face to face today. My clinical findings that support the need for the home health skilled services and home bound status are the following:  Weakness/numbness causing balance and gait disturbance due to Stroke making it taxing to leave home.    Allergies:  Review of patient's allergies indicates:   Allergen Reactions    Valium [diazepam]        Diet: diabetic diet: 1800 calorie    Activities: activity as tolerated    Nursing:   SN to complete comprehensive assessment including routine vital signs. Instruct on disease process and s/s of complications to report to MD. Review/verify medication list sent home with the patient at time of discharge  and instruct patient/caregiver as needed. Frequency may  be adjusted depending on start of care date.    Notify MD if SBP > 160 or < 90; DBP > 90 or < 50; HR > 120 or < 50; Temp > 101;       CONSULTS:    Physical Therapy to evaluate and treat. Evaluate for home safety and equipment needs; Establish/upgrade home exercise program. Perform / instruct on therapeutic exercises, gait training, transfer training, and Range of Motion.  Occupational Therapy to evaluate and treat. Evaluate home environment for safety and equipment needs. Perform/Instruct on transfers, ADL training, ROM, and therapeutic exercises.    MISCELLANEOUS CARE:  Diabetic Care:   SN to perform and educate Diabetic management with blood glucose monitoring:    WOUND CARE ORDERS  n/a      Medications: Review discharge medications with patient and family and provide education.      Current Discharge Medication List      CONTINUE these medications which have NOT CHANGED    Details   baclofen (LIORESAL) 10 MG tablet Take 10 mg by mouth 2 (two) times daily.      atorvastatin (LIPITOR) 10 MG tablet Take 10 mg by mouth once daily.      benazepril (LOTENSIN) 20 MG tablet Take 20 mg by mouth once daily.      carisoprodol (SOMA) 350 MG tablet Take 350 mg by mouth 4 (four) times daily as needed.      carvedilol (COREG) 12.5 MG tablet Take 12.5 mg by mouth 2 (two) times daily with meals.      clonidine (CATAPRES) 0.2 MG tablet Take 0.2 mg by mouth 2 (two) times daily.      gabapentin (NEURONTIN) 600 MG tablet Take 600 mg by mouth 3 (three) times daily.      metformin (GLUCOPHAGE) 500 MG tablet Take 500 mg by mouth 2 (two) times daily with meals.      nitroGLYCERIN 0.4 MG/DOSE TL SPRY (NITROLINGUAL) 0.4 mg/dose spray Place 1 spray under the tongue every 5 (five) minutes as needed.      olmesartan-amlodipine-hctz (TRIBENZOR) 40-10-25 mg Tab Take by mouth.      oxycodone (OXY-IR) 5 mg Cap Take 10 mg by mouth every 4 (four) hours as needed.      oxycodone (ROXICODONE) 30 MG Tab Take 5 mg by mouth every 6 (six) hours as  needed.             I certify that this patient is confined to his home and needs physical therapy and occupational therapy.    DARIO Grant MD  Neurology PGY2

## 2017-04-12 NOTE — PLAN OF CARE
Problem: Patient Care Overview  Goal: Plan of Care Review  Outcome: Ongoing (interventions implemented as appropriate)  POC reviewed with pt at 0400. Pt verbalized understanding. Questions and concerns addressed. No acute events overnight. Pt progressing toward goals. Pt awaiting bed in [facility]. Will continue to monitor. See flowsheets for full assessment and VS info

## 2017-04-13 ENCOUNTER — TELEPHONE (OUTPATIENT)
Dept: NEUROSURGERY | Facility: HOSPITAL | Age: 52
End: 2017-04-13

## 2017-04-13 NOTE — TELEPHONE ENCOUNTER
"Attempted to contact patient via number on file.  No answer.  The following message was left for patient to return call "Hello.  This is a message for Sammy Bustos.  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  If you could give me call back at [phone] between the hours of 08:00 AM and 04:00 PM, Monday through Friday.  Thanks so much and have a great day."  Will try again later.   "

## 2017-04-13 NOTE — PT/OT/SLP DISCHARGE
Physical Therapy Discharge Summary    Sammy Bustos  MRN: [id number]   Embolic stroke involving right middle cerebral artery     Patient Discharged from acute Physical Therapy on 2017.  Please refer to prior PT noted date on 2017 for functional status.     Assessment:   Patient appropriate for care in another setting.  GOALS:   Physical Therapy Goals     Not on file      Multidisciplinary Problems (Resolved)        Problem: Physical Therapy Goal    Goal Priority Disciplines Outcome Goal Variances Interventions   Physical Therapy Goal   (Resolved)     PT/OT, PT Outcome(s) achieved     Description:  Goals to be met by: 2017     Patient will increase functional independence with mobility by performin. Sit to stand transfer with Supervision using AD or No AD  2. Bed to chair transfer with Stand-by Assistance using AD or No AD  3. Gait x25 feet with Stand-by Assistance using AD or NO AD  4. Ascend/descend x5 stairs with right Handrails and Stand-by Assistance  5. Stand for x5 minutes with Supervision while performing dynamic balance tasks  6. Lower extremity exercise program x15 reps per handout, with supervision              Reasons for Discontinuation of Therapy Services  Transfer to alternate level of care.      Plan:  Patient Discharged to: Home with Home Health Service.    Roslyn Toledo, PT, DPT  [phone]  2017

## 2017-04-17 ENCOUNTER — TELEPHONE (OUTPATIENT)
Dept: NEUROSURGERY | Facility: HOSPITAL | Age: 52
End: 2017-04-17

## 2017-04-17 NOTE — TELEPHONE ENCOUNTER
"Attempted to contact patient via number on file.  No answer.  The following message was left for patient to return call "Hello.  This is a message for Sammy Bustos.  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  If you could give me call back at [phone] between the hours of 08:00 AM and 04:00 PM, Monday through Friday.  Thanks so much and have a great day."  Will try again later.   "

## 2017-04-18 ENCOUNTER — TELEPHONE (OUTPATIENT)
Dept: NEUROSURGERY | Facility: HOSPITAL | Age: 52
End: 2017-04-18

## 2017-04-18 NOTE — TELEPHONE ENCOUNTER
Attempted to contact patient via number on file.  No answer.  Unable to leave a message.  Third unsuccessful attempt.

## 2017-04-21 NOTE — PHYSICIAN QUERY
PT Name: Sammy Bustos  MR #: [id number]  Physician Query Form - Renal Clarification     CDS/: Roslyn Colunga               Contact information: [email or web address]    This form is a permanent document in the medical record.     QueryDate: April 21, 2017    By submitting this query, we are merely seeking further clarification of documentation. Please utilize your independent clinical judgment when addressing the question(s) below.    The Medical record contains the following:   Indicator Supporting Clinical Findings Location in Medical Record    Kidney (Renal) Insufficiency     X Kidney (Renal) Failure / Injury atn 2/2 dehydration/ALONSO H&P/Progress notes and D/S    Nephrotoxic Agents     X BUN/Creatinine GFR 18/1.6 Lab 4/10    Urine: Casts         Eosinophils     X Dehydration atn 2/2 dehydration H&P    Nausea/Vomiting      Dialysis/CRRT      Treatment:      Other:          Provider, please specify the diagnosis or diagnoses associated with above clinical findings.    [ ] Acute Kidney Failure/Injury with Acute Cortical Necrosis  [ ] Acute Kidney Failure/Injury with Medullary Necrosis  [x ] Acute Kidney Failure/Injury with Tubular Necrosis  [ ] Other Acute Kidney Failure/Injury (please specify): ____________  [ ] Unspecified Acute Kidney Failure/Injury  [ ] Acute Nephritic Syndrome  [ ] Acute Renal Insufficiency   [ ] Acute on Chronic Renal Insufficiency (please specify stage*): _____________  [ ] Acute on Chronic Renal Failure (please specify stage*): _____________  [ ] Chronic Renal Insufficiency (please specify stage*): _____________  [ ] Chronic Kidney Disease (CKD) (please specify stage* below):      *[employer] Definitions:   Stage Description      eGFR (mL/min)   [ ] I  Slight kidney damage with normal or increased filtration  90+   [ ] II  Mildly reduced kidney function     60-89   [ ] III  Moderately reduced kidney function    30-59   [ ] IV  Severly reduced kidney function     15-29   [  ] V  Kidney failure, requiring transplant or dialysis   < 15      [ ] CKD on Chronic Hemodialysis (please specify stage*): _____________  [ ] End Stage Renal Disease (ESRD)  [ ] Other (please specify): _______________________________  [ ] Clinically Undetermined    Please document in your progress notes daily for the duration of treatment, until resolved, and include in your discharge summary.

## 2017-07-17 ENCOUNTER — TELEPHONE (OUTPATIENT)
Dept: ADMINISTRATIVE | Facility: OTHER | Age: 52
End: 2017-07-17

## 2023-07-31 NOTE — NURSING
Transported pt to MRI on cardiac monitor, with emergency equipment.  No acute events.   Render Post-Care Instructions In Note?: no Extraction Method: 18 gauge needle and comedo extractor Render Number Of Lesions Treated: yes Detail Level: Detailed Prep Text (Optional): Prior to removal the treatment areas were prepped in the usual fashion. Acne Type: Comedonal Lesions Consent was obtained and risks were reviewed including but not limited to scarring, infection, bleeding, scabbing, incomplete removal, and allergy to anesthesia. Post-Care Instructions: I reviewed with the patient in detail post-care instructions. Patient is to keep the treatment areas dry overnight, and then apply bacitracin twice daily until healed. Patient may apply hydrogen peroxide soaks to remove any crusting.